# Patient Record
Sex: MALE | Race: WHITE | NOT HISPANIC OR LATINO | Employment: PART TIME | URBAN - NONMETROPOLITAN AREA
[De-identification: names, ages, dates, MRNs, and addresses within clinical notes are randomized per-mention and may not be internally consistent; named-entity substitution may affect disease eponyms.]

---

## 2017-06-14 ENCOUNTER — HOSPITAL ENCOUNTER (EMERGENCY)
Facility: HOSPITAL | Age: 52
Discharge: HOME | End: 2017-06-14
Attending: FAMILY MEDICINE | Admitting: FAMILY MEDICINE
Payer: MEDICARE

## 2017-06-14 VITALS
DIASTOLIC BLOOD PRESSURE: 90 MMHG | OXYGEN SATURATION: 97 % | HEART RATE: 81 BPM | RESPIRATION RATE: 20 BRPM | SYSTOLIC BLOOD PRESSURE: 141 MMHG | TEMPERATURE: 97.4 F

## 2017-06-14 DIAGNOSIS — K29.70 GASTRITIS WITHOUT BLEEDING, UNSPECIFIED CHRONICITY, UNSPECIFIED GASTRITIS TYPE: ICD-10-CM

## 2017-06-14 PROCEDURE — 99283 EMERGENCY DEPT VISIT LOW MDM: CPT

## 2017-06-14 PROCEDURE — 99284 EMERGENCY DEPT VISIT MOD MDM: CPT | Performed by: FAMILY MEDICINE

## 2017-06-14 NOTE — ED AVS SNAPSHOT
[facility]    [address] [address]    Phone:  [phone]                                       Bradley Garcia   MRN: [id number]    Department:  [facility]   Date of Visit:  6/14/2017           Patient Information     Date Of Birth          1965        Your diagnoses for this visit were:     Gastritis without bleeding, unspecified chronicity, unspecified gastritis type        You were seen by Crystal Bess MD.      Follow-up Information     Follow up with Cayla Fleming NP.    Specialty:  Nurse Practitioner    Why:  As needed    Contact information:    [facility]  [address] [facility] [address] [phone]          Discharge Instructions         Understanding Gastritis    Gastritis is a painful inflammation of the stomach lining. It has a number of causes. Gastritis and its symptoms can be relieved with treatment. Work with your healthcare provider to find ways to treat your symptoms.  The Stomach  To digest the food you eat, your stomach makes strong acids and enzymes. A healthy stomach has built-in defenses that protect its lining from damage by these acids and enzymes.  When you have gastritis  Acids may damage the stomach lining when the built-in defenses of the stomach don t function as they should. The stomach lining can then become inflamed. When this happens, it is called gastritis.  Causes of gastritis  Gastritis has many causes. They may include:    Aspirin and anti-inflammatory medicines    Tobacco use    Alcohol use    Helicobacter pylori (H. pylori) bacteria    Trauma from injuries, burns, or major surgery    Critical illness or autoimmune disorders  Common symptoms  With gastritis, you may notice one or more of the following:    A burning feeling in your upper belly    Pain that happens after eating certain foods    Gas or a bloated feeling in your stomach    Frequent belching    Nausea with or without  vomiting    Loss of appetite    Feeling full quickly    Fatigue  Date Last Reviewed: 7/1/2016 2000-2017 [employer]. [address] [place] [address], [place] [address]. All rights reserved. This information is not intended as a substitute for professional medical care. Always follow your healthcare professional's instructions.             Review of your medicines      START taking        Dose / Directions Last dose taken    omeprazole 20 MG CR capsule   Commonly known as:  priLOSEC   Dose:  20 mg   Quantity:  4 capsule        Take 1 capsule (20 mg) by mouth daily for 4 days   Refills:  0          Our records show that you are taking the medicines listed below. If these are incorrect, please call your family doctor or clinic.        Dose / Directions Last dose taken    ASPIRIN PO   Dose:  81 mg        Take 81 mg by mouth daily   Refills:  0        bismuth subsalicylate 262 MG/15ML suspension   Commonly known as:  PEPTO BISMOL   Dose:  15 mL        Take 15 mLs by mouth every 6 hours as needed for indigestion   Refills:  0        blood glucose lancets standard   Commonly known as:  no brand specified   Dose:  1 lancet   Quantity:  1 kit        1 lancet daily.   Refills:  prn        BLOOD GLUCOSE TEST STRIPS STRP   Dose:  1 strip   Quantity:  50 strip        1 strip daily.   Refills:  12        IBUPROFEN   Dose:  600 mg        600 mg as needed.   Refills:  0        Levothyroxine Sodium 50 MCG Caps   Dose:  50 mcg        Take 50 mcg by mouth daily.   Refills:  0        METFORMIN HCL PO        Take by mouth 2 times daily (with meals)   Refills:  0                Prescriptions were sent or printed at these locations (1 Prescription)                   Other Prescriptions                Not Printed or Sent (1 of 1)         omeprazole (PRILOSEC) 20 MG CR capsule                Orders Needing Specimen Collection     None      Pending Results     No orders found from 6/12/2017 to 6/15/2017.            Pending Culture  "Results     No orders found from 2017 to 6/15/2017.            Thank you for choosing [facility]       Thank you for choosing [facility] for your care. Our goal is always to provide you with excellent care. Hearing back from our patients is one way we can continue to improve our services. Please take a few minutes to complete the written survey that you may receive in the mail after you visit with us. Thank you!        [employer] Information     [employer] lets you send messages to your doctor, view your test results, renew your prescriptions, schedule appointments and more. To sign up, go to [email or web address] . Click on \"Log in\" on the left side of the screen, which will take you to the Welcome page. Then click on \"Sign up Now\" on the right side of the page.     You will be asked to enter the access code listed below, as well as some personal information. Please follow the directions to create your username and password.     Your access code is: [email or web address]  Expires: 2017 10:55 PM     Your access code will  in 90 days. If you need help or a new code, please call your [facility] clinic or [phone].        Care EveryWhere ID     This is your Care EveryWhere ID. This could be used by other organizations to access your [facility] medical records  [id number]        After Visit Summary       This is your record. Keep this with you and show to your community pharmacist(s) and doctor(s) at your next visit.                  "

## 2017-06-14 NOTE — ED AVS SNAPSHOT
[facility]    [address] [place] [address]    Phone:  [phone]                                       Bradley Garcia   MRN: [id number]    Department[facility]  [facility]   Date of Visit:  6/14/2017           After Visit Summary Signature Page     I have received my discharge instructions, and my questions have been answered. I have discussed any challenges I see with this plan with the nurse or doctor.    ..........................................................................................................................................  Patient/Patient Representative Signature      ..........................................................................................................................................  Patient Representative Print Name and Relationship to Patient    ..................................................               ................................................  Date                                            Time    ..........................................................................................................................................  Reviewed by Signature/Title    ...................................................              ..............................................  Date                                                            Time

## 2017-06-15 ASSESSMENT — ENCOUNTER SYMPTOMS
VOMITING: 0
NEUROLOGICAL NEGATIVE: 1
CONSTIPATION: 0
ACTIVITY CHANGE: 1
NAUSEA: 0
DIAPHORESIS: 0
DIARRHEA: 0
ROS GI COMMENTS: SEE HPI.
ABDOMINAL PAIN: 1
FEVER: 0
SHORTNESS OF BREATH: 0
FATIGUE: 0

## 2017-06-15 NOTE — DISCHARGE INSTRUCTIONS
Understanding Gastritis    Gastritis is a painful inflammation of the stomach lining. It has a number of causes. Gastritis and its symptoms can be relieved with treatment. Work with your healthcare provider to find ways to treat your symptoms.  The Stomach  To digest the food you eat, your stomach makes strong acids and enzymes. A healthy stomach has built-in defenses that protect its lining from damage by these acids and enzymes.  When you have gastritis  Acids may damage the stomach lining when the built-in defenses of the stomach don t function as they should. The stomach lining can then become inflamed. When this happens, it is called gastritis.  Causes of gastritis  Gastritis has many causes. They may include:    Aspirin and anti-inflammatory medicines    Tobacco use    Alcohol use    Helicobacter pylori (H. pylori) bacteria    Trauma from injuries, burns, or major surgery    Critical illness or autoimmune disorders  Common symptoms  With gastritis, you may notice one or more of the following:    A burning feeling in your upper belly    Pain that happens after eating certain foods    Gas or a bloated feeling in your stomach    Frequent belching    Nausea with or without vomiting    Loss of appetite    Feeling full quickly    Fatigue  Date Last Reviewed: 7/1/2016 2000-2017 [employer] [address] [place] [address]. All rights reserved. This information is not intended as a substitute for professional medical care. Always follow your healthcare professional's instructions.

## 2017-06-15 NOTE — ED NOTES
"Pt states he had some sausage in the counter thawing, states he ate some for supper, approx 2.5 hours later he started vomiting undigested food. Pt points to epigastric area/mid upper abdomen when talking about the pain. Stated he took some Pepto bismol \"and now I feel a little bit better\". Pt brought in by his brother. Brother states the sausage didn't have a bad odor and looked ok to him. Pt reports no loose stools.  "

## 2017-06-15 NOTE — ED PROVIDER NOTES
"  History     Chief Complaint   Patient presents with     Abdominal Pain     ate some \"brats\" and has vomited 5x     HPI  Bradley Garcia is a 51 year old male who ate some brats and had abdominal pain and vomiting x5.  He has taken some Pepto Bismol, and the medication took effect when he got to the ED, so now he has no pain and the nausea is gone.      I have reviewed the Medications, Allergies, Past Medical and Surgical History, and Social History in the Epic system.    Allergies: No Known Allergies      No current facility-administered medications on file prior to encounter.   Current Outpatient Prescriptions on File Prior to Encounter:  Levothyroxine Sodium 50 MCG CAPS Take 50 mcg by mouth daily.   Blood Glucose Monitoring Suppl (BLOOD GLUCOSE TEST STRIPS STRP) 1 strip daily.   lancets misc. KIT 1 lancet daily.   IBUPROFEN 600 mg as needed.       Patient Active Problem List   Diagnosis     DM type 2 (diabetes mellitus, type 2) (H)       No past surgical history on file.    Social History   Substance Use Topics     Smoking status: Current Some Day Smoker     Smokeless tobacco: Not on file     Alcohol use Yes      Comment: binge daily 23 beers today         There is no immunization history on file for this patient.    BMI: Estimated body mass index is 47.31 kg/(m^2) as calculated from the following:    Height as of 5/30/13: 1.803 m (5' 11\").    Weight as of 5/30/13: 153.9 kg (339 lb 3.2 oz).      Review of Systems   Constitutional: Positive for activity change. Negative for diaphoresis, fatigue and fever.   HENT: Negative.    Respiratory: Negative for shortness of breath.    Cardiovascular: Negative for chest pain.   Gastrointestinal: Positive for abdominal pain. Negative for constipation, diarrhea, nausea and vomiting.        See HPI.   Genitourinary: Negative.    Neurological: Negative.        Physical Exam   BP: 151/92  Pulse: 81  Temp: 98.2  F (36.8  C)  Resp: 18  SpO2: 96 %  Physical Exam   Constitutional: " He appears well-developed and well-nourished.   Cardiovascular: Normal rate, regular rhythm and normal heart sounds.    No murmur heard.  Pulmonary/Chest: Effort normal and breath sounds normal. No respiratory distress.   Abdominal: Soft. Bowel sounds are normal. He exhibits no distension. There is no tenderness.   Skin: Skin is warm and dry.   Psychiatric: He has a normal mood and affect.   Nursing note and vitals reviewed.      ED Course     ED Course     Procedures      Labs Ordered and Resulted from Time of ED Arrival Up to the Time of Departure from the ED - No data to display    Assessments & Plan (with Medical Decision Making)   Patient doing well, exam entirely negative.  Will discharge on 4 days of omeprazole 20mg to calm stomach down, otherwise no change.  Follow up with primary care as needed.    I have reviewed the nursing notes.    I have reviewed the findings, diagnosis, plan and need for follow up with the patient.    New Prescriptions    OMEPRAZOLE (PRILOSEC) 20 MG CR CAPSULE    Take 1 capsule (20 mg) by mouth daily for 4 days       Final diagnoses:   Gastritis without bleeding, unspecified chronicity, unspecified gastritis type       6/14/2017   [facility]     Crystal Bess MD  06/15/17 0176

## 2017-11-02 ENCOUNTER — APPOINTMENT (OUTPATIENT)
Dept: OCCUPATIONAL MEDICINE | Facility: OTHER | Age: 52
End: 2017-11-02

## 2017-11-02 PROCEDURE — 99000 SPECIMEN HANDLING OFFICE-LAB: CPT

## 2017-12-02 ENCOUNTER — APPOINTMENT (OUTPATIENT)
Dept: GENERAL RADIOLOGY | Facility: HOSPITAL | Age: 52
End: 2017-12-02
Attending: INTERNAL MEDICINE
Payer: MEDICARE

## 2017-12-02 ENCOUNTER — HOSPITAL ENCOUNTER (EMERGENCY)
Facility: HOSPITAL | Age: 52
Discharge: HOME | End: 2017-12-03
Attending: INTERNAL MEDICINE | Admitting: INTERNAL MEDICINE
Payer: MEDICARE

## 2017-12-02 ENCOUNTER — APPOINTMENT (OUTPATIENT)
Dept: CT IMAGING | Facility: HOSPITAL | Age: 52
End: 2017-12-02
Attending: INTERNAL MEDICINE
Payer: MEDICARE
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
## 2017-12-02 DIAGNOSIS — K86.0 ALCOHOL-INDUCED CHRONIC PANCREATITIS (H): ICD-10-CM

## 2017-12-02 DIAGNOSIS — F10.10 ALCOHOL ABUSE: ICD-10-CM

## 2017-12-02 LAB
ALBUMIN UR-MCNC: NEGATIVE MG/DL
AMPHETAMINES UR QL SCN: NEGATIVE
APPEARANCE UR: CLEAR
BACTERIA #/AREA URNS HPF: ABNORMAL /HPF
BARBITURATES UR QL: NEGATIVE
BASOPHILS # BLD AUTO: 0.1 10E9/L (ref 0–0.2)
BASOPHILS NFR BLD AUTO: 0.6 %
BENZODIAZ UR QL: NEGATIVE
BILIRUB UR QL STRIP: NEGATIVE
CANNABINOIDS UR QL SCN: NEGATIVE
COCAINE UR QL: NEGATIVE
COLOR UR AUTO: ABNORMAL
DIFFERENTIAL METHOD BLD: ABNORMAL
EOSINOPHIL # BLD AUTO: 0.3 10E9/L (ref 0–0.7)
EOSINOPHIL NFR BLD AUTO: 2 %
ERYTHROCYTE [DISTWIDTH] IN BLOOD BY AUTOMATED COUNT: 13.3 % (ref 10–15)
GLUCOSE UR STRIP-MCNC: NEGATIVE MG/DL
HCT VFR BLD AUTO: 41.5 % (ref 40–53)
HGB BLD-MCNC: 14.3 G/DL (ref 13.3–17.7)
HGB UR QL STRIP: NEGATIVE
IMM GRANULOCYTES # BLD: 0.1 10E9/L (ref 0–0.4)
IMM GRANULOCYTES NFR BLD: 0.4 %
KETONES UR STRIP-MCNC: NEGATIVE MG/DL
LEUKOCYTE ESTERASE UR QL STRIP: NEGATIVE
LYMPHOCYTES # BLD AUTO: 3.2 10E9/L (ref 0.8–5.3)
LYMPHOCYTES NFR BLD AUTO: 25.8 %
MCH RBC QN AUTO: 30.6 PG (ref 26.5–33)
MCHC RBC AUTO-ENTMCNC: 34.5 G/DL (ref 31.5–36.5)
MCV RBC AUTO: 89 FL (ref 78–100)
METHADONE UR QL SCN: NEGATIVE
MONOCYTES # BLD AUTO: 0.9 10E9/L (ref 0–1.3)
MONOCYTES NFR BLD AUTO: 7 %
NEUTROPHILS # BLD AUTO: 8.1 10E9/L (ref 1.6–8.3)
NEUTROPHILS NFR BLD AUTO: 64.2 %
NITRATE UR QL: NEGATIVE
NRBC # BLD AUTO: 0 10*3/UL
NRBC BLD AUTO-RTO: 0 /100
OPIATES UR QL SCN: NEGATIVE
PCP UR QL SCN: NEGATIVE
PH UR STRIP: 5 PH (ref 4.7–8)
PLATELET # BLD AUTO: 241 10E9/L (ref 150–450)
RBC # BLD AUTO: 4.67 10E12/L (ref 4.4–5.9)
RBC #/AREA URNS AUTO: 0 /HPF (ref 0–2)
SOURCE: ABNORMAL
SP GR UR STRIP: 1 (ref 1–1.03)
UROBILINOGEN UR STRIP-MCNC: NORMAL MG/DL (ref 0–2)
WBC # BLD AUTO: 12.6 10E9/L (ref 4–11)
WBC #/AREA URNS AUTO: 0 /HPF (ref 0–2)

## 2017-12-02 PROCEDURE — 80307 DRUG TEST PRSMV CHEM ANLYZR: CPT | Performed by: INTERNAL MEDICINE

## 2017-12-02 PROCEDURE — 99285 EMERGENCY DEPT VISIT HI MDM: CPT | Performed by: INTERNAL MEDICINE

## 2017-12-02 PROCEDURE — 70450 CT HEAD/BRAIN W/O DYE: CPT | Mod: TC

## 2017-12-02 PROCEDURE — 25000128 H RX IP 250 OP 636: Performed by: INTERNAL MEDICINE

## 2017-12-02 PROCEDURE — 83690 ASSAY OF LIPASE: CPT | Performed by: INTERNAL MEDICINE

## 2017-12-02 PROCEDURE — 85025 COMPLETE CBC W/AUTO DIFF WBC: CPT | Performed by: INTERNAL MEDICINE

## 2017-12-02 PROCEDURE — 81001 URINALYSIS AUTO W/SCOPE: CPT | Mod: 59 | Performed by: INTERNAL MEDICINE

## 2017-12-02 PROCEDURE — 99284 EMERGENCY DEPT VISIT MOD MDM: CPT | Mod: 25

## 2017-12-02 PROCEDURE — 80320 DRUG SCREEN QUANTALCOHOLS: CPT | Performed by: INTERNAL MEDICINE

## 2017-12-02 PROCEDURE — 36415 COLL VENOUS BLD VENIPUNCTURE: CPT | Performed by: INTERNAL MEDICINE

## 2017-12-02 PROCEDURE — 82550 ASSAY OF CK (CPK): CPT | Performed by: INTERNAL MEDICINE

## 2017-12-02 PROCEDURE — 73562 X-RAY EXAM OF KNEE 3: CPT | Mod: TC,RT

## 2017-12-02 PROCEDURE — 80053 COMPREHEN METABOLIC PANEL: CPT | Performed by: INTERNAL MEDICINE

## 2017-12-02 RX ADMIN — SODIUM CHLORIDE 1000 ML: 9 INJECTION, SOLUTION INTRAVENOUS at 23:50
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
## 2017-12-02 NOTE — ED AVS SNAPSHOT
[facility]    [address] [place] [address]    Phone:  [phone]                                       Bradley Garcia   MRN: [id number]    Department[facility]  [facility]   Date of Visit:  12/2/2017           After Visit Summary Signature Page     I have received my discharge instructions, and my questions have been answered. I have discussed any challenges I see with this plan with the nurse or doctor.    ..........................................................................................................................................  Patient/Patient Representative Signature      ..........................................................................................................................................  Patient Representative Print Name and Relationship to Patient    ..................................................               ................................................  Date                                            Time    ..........................................................................................................................................  Reviewed by Signature/Title    ...................................................              ..............................................  Date                                                            Time

## 2017-12-03 VITALS
TEMPERATURE: 98 F | SYSTOLIC BLOOD PRESSURE: 118 MMHG | RESPIRATION RATE: 20 BRPM | OXYGEN SATURATION: 98 % | DIASTOLIC BLOOD PRESSURE: 66 MMHG | HEART RATE: 90 BPM

## 2017-12-03 LAB
ALBUMIN SERPL-MCNC: 3.7 G/DL (ref 3.4–5)
ALP SERPL-CCNC: 88 U/L (ref 40–150)
ALT SERPL W P-5'-P-CCNC: 27 U/L (ref 0–70)
ANION GAP SERPL CALCULATED.3IONS-SCNC: 10 MMOL/L (ref 3–14)
AST SERPL W P-5'-P-CCNC: 25 U/L (ref 0–45)
BILIRUB SERPL-MCNC: 0.5 MG/DL (ref 0.2–1.3)
BUN SERPL-MCNC: 18 MG/DL (ref 7–30)
CALCIUM SERPL-MCNC: 8.8 MG/DL (ref 8.5–10.1)
CHLORIDE SERPL-SCNC: 104 MMOL/L (ref 94–109)
CK SERPL-CCNC: 168 U/L (ref 30–300)
CO2 SERPL-SCNC: 22 MMOL/L (ref 20–32)
CREAT SERPL-MCNC: 0.8 MG/DL (ref 0.66–1.25)
ETHANOL SERPL-MCNC: 0.22 G/DL
GFR SERPL CREATININE-BSD FRML MDRD: >90 ML/MIN/1.7M2
GLUCOSE SERPL-MCNC: 110 MG/DL (ref 70–99)
LIPASE SERPL-CCNC: 967 U/L (ref 73–393)
POTASSIUM SERPL-SCNC: 4 MMOL/L (ref 3.4–5.3)
PROT SERPL-MCNC: 8.6 G/DL (ref 6.8–8.8)
SODIUM SERPL-SCNC: 136 MMOL/L (ref 133–144)

## 2017-12-03 ASSESSMENT — ENCOUNTER SYMPTOMS
CONFUSION: 0
VOMITING: 0
FEVER: 0
SLEEP DISTURBANCE: 0
BLOOD IN STOOL: 0
PALPITATIONS: 0
COLOR CHANGE: 0
DIFFICULTY BREATHING: 0
HEADACHES: 0
MYALGIAS: 0
FREQUENCY: 0
NAUSEA: 0
DYSURIA: 0
ANAL BLEEDING: 0
BACK PAIN: 0
SEIZURES: 0
CHEST TIGHTNESS: 0
CHILLS: 0
DIZZINESS: 0
WHEEZING: 0
LOSS OF CONSCIOUSNESS: 0
NECK PAIN: 0
ABDOMINAL PAIN: 0
ABDOMINAL DISTENTION: 0
FLANK PAIN: 0
VOICE CHANGE: 0
DIAPHORESIS: 0
COUGH: 0
NUMBNESS: 0
SHORTNESS OF BREATH: 0
LIGHT-HEADEDNESS: 0
WEAKNESS: 0

## 2017-12-03 NOTE — ED NOTES
"Pt ok to d/c if he can tolerate food for 15 minutes without vomiting. Pt asked if he is willing to go to detox and he states \"yes\". Pt's brother willing to drive pt to detox in [place].  "
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
## 2017-12-03 NOTE — ED NOTES
Called detox and spoke with Demetria, nurse, who states that they have a bed. Pt again verbalizes agreement to go to detox. Pt and brother agree that it is ok for this RN to fax face sheet and BA and RACHUA labs to detox. Pt ate crackers and applesauce, denies any abdominal pain, nausea or vomiting. Per detox request, pt and brother will stop at home and  pt's home medications.
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
## 2017-12-03 NOTE — ED NOTES
Per Dr. Santo, do not need to upgrade trauma evaluation to a Level 2. Also per Dr. Santo, pt does not need a c-collar.
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
## 2017-12-07 ENCOUNTER — HOSPITAL ENCOUNTER (OUTPATIENT)
Dept: MRI IMAGING | Facility: HOSPITAL | Age: 52
Discharge: HOME | End: 2017-12-07
Attending: NURSE PRACTITIONER | Admitting: NURSE PRACTITIONER
Payer: MEDICARE

## 2017-12-07 DIAGNOSIS — M25.561 RIGHT KNEE PAIN: ICD-10-CM

## 2017-12-07 DIAGNOSIS — M23.51 CHRONIC INSTABILITY OF RIGHT KNEE: ICD-10-CM

## 2017-12-07 PROCEDURE — 73721 MRI JNT OF LWR EXTRE W/O DYE: CPT | Mod: TC,RT

## 2018-07-13 ENCOUNTER — APPOINTMENT (OUTPATIENT)
Dept: OCCUPATIONAL MEDICINE | Facility: OTHER | Age: 53
End: 2018-07-13

## 2018-07-13 PROCEDURE — 99000 SPECIMEN HANDLING OFFICE-LAB: CPT

## 2019-03-02 ENCOUNTER — APPOINTMENT (OUTPATIENT)
Dept: CT IMAGING | Facility: HOSPITAL | Age: 54
End: 2019-03-02
Attending: EMERGENCY MEDICINE
Payer: MEDICARE

## 2019-03-02 ENCOUNTER — TELEPHONE (OUTPATIENT)
Dept: EMERGENCY MEDICINE | Facility: HOSPITAL | Age: 54
End: 2019-03-02

## 2019-03-02 ENCOUNTER — HOSPITAL ENCOUNTER (EMERGENCY)
Facility: HOSPITAL | Age: 54
Discharge: HOME | End: 2019-03-02
Attending: EMERGENCY MEDICINE | Admitting: EMERGENCY MEDICINE
Payer: MEDICARE

## 2019-03-02 VITALS
OXYGEN SATURATION: 97 % | TEMPERATURE: 98.6 F | SYSTOLIC BLOOD PRESSURE: 134 MMHG | DIASTOLIC BLOOD PRESSURE: 75 MMHG | RESPIRATION RATE: 20 BRPM

## 2019-03-02 DIAGNOSIS — F10.929 ALCOHOLIC INTOXICATION WITH COMPLICATION (H): ICD-10-CM
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
## 2019-03-02 DIAGNOSIS — R73.9 HYPERGLYCEMIA: ICD-10-CM

## 2019-03-02 DIAGNOSIS — I10 HYPERTENSION, UNSPECIFIED TYPE: ICD-10-CM

## 2019-03-02 LAB
ANION GAP SERPL CALCULATED.3IONS-SCNC: 13 MMOL/L (ref 3–14)
BASOPHILS # BLD AUTO: 0.1 10E9/L (ref 0–0.2)
BASOPHILS NFR BLD AUTO: 0.9 %
BUN SERPL-MCNC: 12 MG/DL (ref 7–30)
CALCIUM SERPL-MCNC: 8.5 MG/DL (ref 8.5–10.1)
CHLORIDE SERPL-SCNC: 104 MMOL/L (ref 94–109)
CO2 SERPL-SCNC: 19 MMOL/L (ref 20–32)
CREAT SERPL-MCNC: 0.82 MG/DL (ref 0.66–1.25)
DIFFERENTIAL METHOD BLD: NORMAL
EOSINOPHIL # BLD AUTO: 0.3 10E9/L (ref 0–0.7)
EOSINOPHIL NFR BLD AUTO: 2.8 %
ERYTHROCYTE [DISTWIDTH] IN BLOOD BY AUTOMATED COUNT: 12.8 % (ref 10–15)
ETHANOL SERPL-MCNC: 0.28 G/DL
GFR SERPL CREATININE-BSD FRML MDRD: >90 ML/MIN/{1.73_M2}
GLUCOSE SERPL-MCNC: 232 MG/DL (ref 70–99)
HCT VFR BLD AUTO: 42.5 % (ref 40–53)
HGB BLD-MCNC: 14.9 G/DL (ref 13.3–17.7)
IMM GRANULOCYTES # BLD: 0.2 10E9/L (ref 0–0.4)
IMM GRANULOCYTES NFR BLD: 1.5 %
LYMPHOCYTES # BLD AUTO: 3.3 10E9/L (ref 0.8–5.3)
LYMPHOCYTES NFR BLD AUTO: 31.7 %
MCH RBC QN AUTO: 30.9 PG (ref 26.5–33)
MCHC RBC AUTO-ENTMCNC: 35.1 G/DL (ref 31.5–36.5)
MCV RBC AUTO: 88 FL (ref 78–100)
MONOCYTES # BLD AUTO: 0.8 10E9/L (ref 0–1.3)
MONOCYTES NFR BLD AUTO: 8 %
NEUTROPHILS # BLD AUTO: 5.7 10E9/L (ref 1.6–8.3)
NEUTROPHILS NFR BLD AUTO: 55.1 %
NRBC # BLD AUTO: 0 10*3/UL
NRBC BLD AUTO-RTO: 0 /100
PLATELET # BLD AUTO: 255 10E9/L (ref 150–450)
POTASSIUM SERPL-SCNC: 3.9 MMOL/L (ref 3.4–5.3)
RBC # BLD AUTO: 4.82 10E12/L (ref 4.4–5.9)
SODIUM SERPL-SCNC: 136 MMOL/L (ref 133–144)
TSH SERPL DL<=0.005 MIU/L-ACNC: 2.2 MU/L (ref 0.4–4)
WBC # BLD AUTO: 10.4 10E9/L (ref 4–11)

## 2019-03-02 PROCEDURE — 80320 DRUG SCREEN QUANTALCOHOLS: CPT | Performed by: EMERGENCY MEDICINE

## 2019-03-02 PROCEDURE — 84443 ASSAY THYROID STIM HORMONE: CPT | Performed by: EMERGENCY MEDICINE

## 2019-03-02 PROCEDURE — 85025 COMPLETE CBC W/AUTO DIFF WBC: CPT | Performed by: EMERGENCY MEDICINE

## 2019-03-02 PROCEDURE — 99284 EMERGENCY DEPT VISIT MOD MDM: CPT | Mod: 25

## 2019-03-02 PROCEDURE — 80048 BASIC METABOLIC PNL TOTAL CA: CPT | Performed by: EMERGENCY MEDICINE

## 2019-03-02 PROCEDURE — 99284 EMERGENCY DEPT VISIT MOD MDM: CPT | Mod: Z6 | Performed by: EMERGENCY MEDICINE

## 2019-03-02 PROCEDURE — 70450 CT HEAD/BRAIN W/O DYE: CPT | Mod: TC

## 2019-03-02 PROCEDURE — 36415 COLL VENOUS BLD VENIPUNCTURE: CPT | Performed by: EMERGENCY MEDICINE

## 2019-03-02 PROCEDURE — 72125 CT NECK SPINE W/O DYE: CPT | Mod: TC

## 2019-03-02 ASSESSMENT — ENCOUNTER SYMPTOMS
NECK PAIN: 0
JOINT SWELLING: 0
BACK PAIN: 0

## 2019-03-02 NOTE — ED AVS SNAPSHOT
[facility]  [address] [place] [id number]  Phone:  [phone]                                    Bradley Garcia   MRN: [id number]    Department:  [facility]   Date of Visit:  3/2/2019           After Visit Summary Signature Page    I have received my discharge instructions, and my questions have been answered. I have discussed any challenges I see with this plan with the nurse or doctor.    ..........................................................................................................................................  Patient/Patient Representative Signature      ..........................................................................................................................................  Patient Representative Print Name and Relationship to Patient    ..................................................               ................................................  Date                                   Time    ..........................................................................................................................................  Reviewed by Signature/Title    ...................................................              ..............................................  Date                                               Time          22EPIC Rev 08/18
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
## 2019-03-02 NOTE — ED NOTES
"Patient requesting to have his \"twin brother Gustavo\" to be called.  [phone]; patient's brother Wilfrido answered and he states his brother Gustavo will be in shortly to pick him up.  "
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
## 2019-03-02 NOTE — ED NOTES
IV was removed.  Discharge VS taken; patient is stable; steady on feet and eating/drinking.  Patient's brother is going to go to his home to  dry clothes and his belongings.  Will be right back.

## 2019-03-02 NOTE — ED NOTES
Patient remains stable.  Dressed in clean/dry clothes.  Patient is discharged with his brother to go to [facility].

## 2019-03-03 NOTE — PLAN OF CARE
Bree De La Vega called for continued care re: info on pt's. Medication & ED visit today. Awaiting SOPHIE, signed by pt. In order to fax info to Detox.

## 2019-03-04 NOTE — TELEPHONE ENCOUNTER
"----- Message from Quin Palacios RN sent at 3/2/2019  4:54 PM CST -----  Regarding: follow up  Bradley Magana was brought to our emergency room via ambulance.  He was found outside in his shirt and underwear \"rolling around in the snow\" patient was intoxicated and could not tell [place] [facility]/EMS why he was rolling around in the snow and intoxicated.  He was brought here for eval.  Once patient sobered up he was able to give us his \"twin brothers name (ean) and number ([phone]).  Ean came to pick his brother up and then requested that the patient goes to detox.  States the family has been trying to keep the patient sober and for the last week he has been doing good.  They are concerned because the patient has a \"friend\"/\"Maira\" that is no good for him and \"uses him\" sometimes starting to call him at 0400 in the morning and calls him every 2 minutes until he picks up.  They have tried talking to her but she continues.  Just wondering if there is any we can/should be doing?    Thanks Quin.  "

## 2020-07-13 ENCOUNTER — TRANSFERRED RECORDS (OUTPATIENT)
Dept: HEALTH INFORMATION MANAGEMENT | Facility: CLINIC | Age: 55
End: 2020-07-13

## 2021-05-11 DIAGNOSIS — I10 ESSENTIAL HYPERTENSION: ICD-10-CM

## 2021-05-11 DIAGNOSIS — E11.9 DM TYPE 2 (DIABETES MELLITUS, TYPE 2) (H): Primary | ICD-10-CM

## 2021-05-11 RX ORDER — GLYBURIDE 5 MG/1
5 TABLET ORAL DAILY
COMMUNITY
Start: 2014-02-11

## 2021-05-11 RX ORDER — NAPROXEN 500 MG/1
1 TABLET ORAL DAILY
COMMUNITY
Start: 2021-01-28

## 2021-05-11 RX ORDER — BACLOFEN 10 MG/1
10 TABLET ORAL 3 TIMES DAILY
COMMUNITY

## 2021-05-11 RX ORDER — NALTREXONE HYDROCHLORIDE 50 MG/1
50 TABLET, FILM COATED ORAL DAILY
COMMUNITY

## 2022-04-17 ENCOUNTER — HOSPITAL ENCOUNTER (EMERGENCY)
Facility: HOSPITAL | Age: 57
Discharge: HOME | End: 2022-04-17
Attending: EMERGENCY MEDICINE | Admitting: EMERGENCY MEDICINE
Payer: MEDICARE

## 2022-04-17 ENCOUNTER — APPOINTMENT (OUTPATIENT)
Dept: CT IMAGING | Facility: HOSPITAL | Age: 57
End: 2022-04-17
Attending: EMERGENCY MEDICINE
Payer: MEDICARE

## 2022-04-17 VITALS
RESPIRATION RATE: 18 BRPM | HEART RATE: 93 BPM | WEIGHT: 315 LBS | BODY MASS INDEX: 47.38 KG/M2 | SYSTOLIC BLOOD PRESSURE: 130 MMHG | TEMPERATURE: 97.9 F | DIASTOLIC BLOOD PRESSURE: 77 MMHG | OXYGEN SATURATION: 97 %

## 2022-04-17 DIAGNOSIS — S01.21XA LACERATION OF NOSE, INITIAL ENCOUNTER: ICD-10-CM

## 2022-04-17 DIAGNOSIS — S00.81XA ABRASION OF FACE, INITIAL ENCOUNTER: ICD-10-CM

## 2022-04-17 DIAGNOSIS — F10.920 ALCOHOLIC INTOXICATION WITHOUT COMPLICATION (H): ICD-10-CM

## 2022-04-17 LAB — GLUCOSE BLDC GLUCOMTR-MCNC: 172 MG/DL (ref 70–99)

## 2022-04-17 PROCEDURE — 99283 EMERGENCY DEPT VISIT LOW MDM: CPT | Mod: 25 | Performed by: EMERGENCY MEDICINE

## 2022-04-17 PROCEDURE — 12011 RPR F/E/E/N/L/M 2.5 CM/<: CPT | Performed by: EMERGENCY MEDICINE

## 2022-04-17 PROCEDURE — 99284 EMERGENCY DEPT VISIT MOD MDM: CPT | Mod: 25

## 2022-04-17 PROCEDURE — 12011 RPR F/E/E/N/L/M 2.5 CM/<: CPT

## 2022-04-17 PROCEDURE — G1004 CDSM NDSC: HCPCS

## 2022-04-17 ASSESSMENT — ENCOUNTER SYMPTOMS
FEVER: 0
CHILLS: 0
SHORTNESS OF BREATH: 0

## 2022-04-17 NOTE — ED NOTES
Phone call to sister Maira, [phone], per patient's request. No answer and no voicemail option.

## 2022-04-17 NOTE — ED PROVIDER NOTES
History     Chief Complaint   Patient presents with     Alcohol Intoxication     Fall     HPI  Bradley Garcia is a 56 year old male who was bib ems s/p mechanical fall. Was drinking at a bar, went home, fell in the middle of an intersection. Attributes this to etoh intox. Denies HA, neck pain. States front of face where he has abrasions is painful. No other complaints. Takes no blood thinners.    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 [facility]- record.   IMO Update 10/11          Past Medical History:    No past medical history on file.    Past Surgical History:    Past Surgical History:   Procedure Laterality Date     MOUTH SURGERY      oral surgery       Family History:    Family History   Problem Relation Age of Onset     Alcoholism Father      Cancer Father      Diabetes Brother      Diabetes Cousin        Social History:  Marital Status:  Single [1]  Social History     Tobacco Use     Smoking status: Never Smoker     Smokeless tobacco: Never Used   Substance Use Topics     Alcohol use: Yes     Comment: beer and whisky      Drug use: No        Medications:    ASPIRIN PO  baclofen (LIORESAL) 10 MG tablet  Blood Glucose Monitoring Suppl (BLOOD GLUCOSE TEST STRIPS STRP)  glyBURIDE (DIABETA /MICRONASE) 5 MG tablet  lancets misc. KIT  Levothyroxine Sodium 50 MCG CAPS  METFORMIN HCL PO  naltrexone (DEPADE/REVIA) 50 MG tablet  naproxen (NAPROSYN) 500 MG tablet  omeprazole (PRILOSEC) 20 MG   capsule          Review of Systems   Constitutional: Negative for chills and fever.   Respiratory: Negative for shortness of breath.    Cardiovascular: Negative for chest pain.   All other systems reviewed and are negative.      Physical Exam   BP: 132/82  Pulse: 98  Temp: 98.3  F (36.8  C)  Resp: 20  Weight: (!) 154.1 kg (339 lb 11.2 oz)  SpO2: 97 %      Physical Exam  Constitutional:       General: He is not in acute distress.     Appearance: Normal appearance.   HENT:      Head: Normocephalic.      Comments: Abrasions noted over face, small 1cm laceration to anterior nose     Right Ear: External ear normal.      Left Ear: External ear normal.      Nose: Nose normal. No rhinorrhea.   Eyes:      Conjunctiva/sclera: Conjunctivae normal.      Comments: Nystagmus c/w etoh intoxication   Cardiovascular:      Rate and Rhythm: Normal rate and regular rhythm.      Pulses: Normal pulses.   Pulmonary:      Effort: Pulmonary effort is normal. No respiratory distress.      Breath sounds: Normal breath sounds.   Abdominal:      General: There is no distension.      Palpations: Abdomen is soft.      Tenderness: There is no abdominal tenderness.   Musculoskeletal:         General: No deformity or signs of injury.   Skin:     General: Skin is warm and dry.      Capillary Refill: Capillary refill takes less than 2 seconds.   Neurological:      General: No focal deficit present.      Mental Status: He is alert and oriented to person, place, and time. Mental status is at baseline.      Cranial Nerves: No cranial nerve deficit.      Sensory: No sensory deficit.      Motor: No weakness.      Coordination: Coordination normal.      Deep Tendon Reflexes: Reflexes normal.      Comments: uncoordinated gait   Psychiatric:         Mood and Affect: Mood normal.         Behavior: Behavior normal.         ED Course                 [facility]    -Laceration Repair    Date/Time: 4/17/2022 3:52 AM  Performed by: Beau Roman  MD  Authorized by: Beau Roman MD     Risks, benefits and alternatives discussed.      ANESTHESIA (see MAR for exact dosages):     Anesthesia method:  None  LACERATION DETAILS     Location:  Face    Face location:  Nose    Length (cm):  1    REPAIR TYPE:     Repair type:  Simple      EXPLORATION:     Wound exploration: wound explored through full range of motion and entire depth of wound probed and visualized      Wound extent: areolar tissue violated      Contaminated: yes      TREATMENT:     Area cleansed with:  Soap and water    Amount of cleaning:  Standard    Irrigation solution:  Tap water    Irrigation volume:  100    Irrigation method:  Pressure wash    Visualized foreign bodies/material removed: yes      SKIN REPAIR     Repair method:  Tissue adhesive    APPROXIMATION     Approximation:  Close    POST-PROCEDURE DETAILS     Dressing:  Open (no dressing)        PROCEDURE    Patient Tolerance:  Patient tolerated the procedure well with no immediate complications               Critical Care time:               Results for orders placed or performed during the hospital encounter of 04/17/22 (from the past 24 hour(s))   Glucose by meter   Result Value Ref [facility]    GLUCOSE BY METER POCT 172 (H) 70 - 99 mg/dL       Medications - No data to display    Assessments & Plan (with Medical Decision Making)     I have reviewed the nursing notes.    I have reviewed the findings, diagnosis, plan and need for follow up with the patient.  55 yo m here w/ intoxication and fall. Ct head and c-spine w/o fx. Laceration to nose repaired. Too intoxicated to go home, will let him sober up here and go home in AM.    New Prescriptions    No medications on file       Final diagnoses:   Alcoholic intoxication without complication (H)   Laceration of nose, initial encounter   Abrasion of face, initial encounter       4/17/2022   [facility]     Beau Roman MD  04/17/22 0354

## 2022-04-17 NOTE — ED NOTES
Face cleaned of dried blood with sterile water and gauze. Tolerated well. Continues to deny any pain.

## 2022-04-17 NOTE — ED NOTES
Patient laying awake in bed. Denies pain. Requesting sister Emiliana be called for a ride home. Generic voicemail left asking Emiliana to call the ER back. Emiliana's cell phone number is not on file in patient's chart and is [phone].

## 2022-04-17 NOTE — ED NOTES
Sister Emiliana called back and states she is 30 minutes out of town and would not be able to pick patient up until 10 am. Emiliana states patient has 2 other siblings in town that might be able to pick him up.

## 2022-04-17 NOTE — ED NOTES
Arrived to ER room 4 via [facility] ambulance with c/o alcohol intoxication and facial injuries after falling in the middle of the street while walking home from the [place]. Patient does not recall falling. Alert and oriented x3 and answering questions appropriately. Reports he was at the bar drinking pitchers of beer and Yifan cokes and does not recall falling. Patient does not know how long he was laying in the street. Denies pain when asked. Has bright red blood and dried black blood around nose, on upper lip, and around nares. Small laceration with small amount of bleeding noted to tip of nose. Abrasion noted to left medial brow with very small amount of bleeding. Small amount of bleeding noted from bilateral nares. Denies being on anticoagulants and home medication list does not list any anticoagulants.

## 2022-04-17 NOTE — ED NOTES
Writer spoke with patient. Patient notified that sister, Emiliana, would not be able to pick him up. Patient asked writer to call his brother, Gustavo. Patient denies any pain or needs at this time.     Writer attempted to call Gustavo, and his brother, Wilfrido answered, reporting that Gustavo had ran to the store and does not have a cell. Wilfrido unable to come  Gustavo at this time, but will have Gustavo call facility when he returns home and will be able to pick patient up. Wilfrido gave an ETA of under 2 hours.

## 2022-04-17 NOTE — ED NOTES
Face cleaned of dried blood again. Tolerated well and continues to deny pain. Dr. Roman at bedside for wound repair with dermabond.

## 2022-04-17 NOTE — ED NOTES
Ambulated to bathroom with use of gait belt and assist of 2 staff. Unsteady on feet due to alcohol intoxication. Ambulated back to bed. All clothing removed. Full skin inspection. Abrasions with no active bleeding noted to bilateral knees. Areas of blanchable redness on left breast and left abdomen that is cool to the touch from where body was laying on cold ground. Denies any pain to areas. No noted injuries on back, buttocks, back of legs, or feet. Incontinent of stool. Cleaned with bath wipes. Clean, dry gown placed. Able to lay self back into bed and adjust self into center of bed. Warm blankets placed. Call light within reach.

## 2022-12-02 ENCOUNTER — HOSPITAL ENCOUNTER (EMERGENCY)
Facility: HOSPITAL | Age: 57
Discharge: HOME | End: 2022-12-02
Attending: NURSE PRACTITIONER | Admitting: NURSE PRACTITIONER
Payer: MEDICARE

## 2022-12-02 VITALS
RESPIRATION RATE: 18 BRPM | OXYGEN SATURATION: 95 % | HEART RATE: 92 BPM | TEMPERATURE: 97.6 F | SYSTOLIC BLOOD PRESSURE: 170 MMHG | DIASTOLIC BLOOD PRESSURE: 103 MMHG

## 2022-12-02 DIAGNOSIS — H61.21 IMPACTED CERUMEN OF RIGHT EAR: ICD-10-CM

## 2022-12-02 DIAGNOSIS — H66.91 RIGHT OTITIS MEDIA: Primary | ICD-10-CM

## 2022-12-02 DIAGNOSIS — H66.91 RIGHT OTITIS MEDIA, UNSPECIFIED OTITIS MEDIA TYPE: ICD-10-CM

## 2022-12-02 PROCEDURE — 999N000104 HC STATISTIC NO CHARGE

## 2022-12-02 PROCEDURE — G0463 HOSPITAL OUTPT CLINIC VISIT: HCPCS

## 2022-12-02 PROCEDURE — 69210 REMOVE IMPACTED EAR WAX UNI: CPT | Mod: RT | Performed by: NURSE PRACTITIONER

## 2022-12-02 PROCEDURE — 69210 REMOVE IMPACTED EAR WAX UNI: CPT

## 2022-12-02 RX ORDER — AMOXICILLIN 875 MG
875 TABLET ORAL 2 TIMES DAILY
Qty: 20 TABLET | Refills: 0 | Status: SHIPPED | OUTPATIENT
Start: 2022-12-02 | End: 2022-12-12

## 2022-12-02 ASSESSMENT — ENCOUNTER SYMPTOMS
PSYCHIATRIC NEGATIVE: 1
FEVER: 0
DIZZINESS: 0
RHINORRHEA: 0
VOMITING: 0
CHILLS: 0
NAUSEA: 0
EYE REDNESS: 0
COUGH: 0
MYALGIAS: 0
SORE THROAT: 0
HEADACHES: 0
DIARRHEA: 0
EYE DISCHARGE: 0
SHORTNESS OF BREATH: 0

## 2022-12-02 NOTE — ED TRIAGE NOTES
Patient presents to urgent care with c/o right sided ear pain. States it started two days ago and is just not getting better. States hes been using a warm pack and that helps some and it did make a pop sound today and no longer hurts as bad as it did before.

## 2022-12-02 NOTE — ED PROVIDER NOTES
History     Chief Complaint   Patient presents with     Otalgia     HPI  Bradley Garcia is a 57 year old male whopresents to urgent care today with complaints of right ear pain.  History of cerumen impaction.  Denies any tinnitus.  Denies any hearing loss.  Denies any dizziness.  Denies any recent URI.  Denies any swimming/flying recently.  Denies any fever, chills, nausea, vomiting, diarrhea, shortness of breath or chest pain.  No other concerns.    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 [facility]- record.   IMO Update 10/11          Past Medical History:    No past medical history on file.    Past Surgical History:    Past Surgical History:   Procedure Laterality Date     MOUTH SURGERY      oral surgery       Family History:    Family History   Problem Relation Age of Onset     Alcoholism Father      Cancer Father      Diabetes Brother      Diabetes Cousin        Social History:  Marital Status:  Single [1]  Social History     Tobacco Use     Smoking status: Never     Smokeless tobacco: Never   Substance Use Topics     Alcohol use: Yes     Comment: beer and whisky      Drug use: No        Medications:    amoxicillin (AMOXIL) 875 MG tablet  ASPIRIN PO  baclofen (LIORESAL) 10 MG tablet  Blood Glucose Monitoring Suppl (BLOOD GLUCOSE TEST STRIPS STRP)  glyBURIDE (DIABETA /MICRONASE) 5 MG tablet  lancets misc. KIT  Levothyroxine Sodium 50 MCG CAPS  METFORMIN HCL PO  naltrexone (DEPADE/REVIA) 50 MG  tablet  naproxen (NAPROSYN) 500 MG tablet  omeprazole (PRILOSEC) 20 MG DR capsule      Review of Systems   Constitutional: Negative for chills and fever.   HENT: Positive for ear pain (right ear). Negative for congestion, hearing loss, rhinorrhea, sore throat and tinnitus.    Eyes: Negative for discharge and redness.   Respiratory: Negative for cough and shortness of breath.    Cardiovascular: Negative for chest pain.   Gastrointestinal: Negative for diarrhea, nausea and vomiting.   Genitourinary: Negative for decreased urine volume.   Musculoskeletal: Negative for myalgias.   Skin: Negative for rash.   Neurological: Negative for dizziness and headaches.   Psychiatric/Behavioral: Negative.      Physical Exam   BP: (!) 179/105  Pulse: 92  Temp: 97.6  F (36.4  C)  Resp: 18  SpO2: 95 %  Recheck  BP: 170/103    Physical Exam  Vitals and nursing note reviewed.   Constitutional:       General: He is not in acute distress.     Appearance: Normal appearance. He is not ill-appearing or toxic-appearing.   HENT:      Head: Normocephalic.      Right Ear: There is impacted cerumen. Tympanic membrane is erythematous and bulging. Tympanic membrane is not perforated.      Left Ear: Tympanic membrane, ear canal and external ear normal.      Nose: Nose normal.      Mouth/Throat:      Mouth: Mucous membranes are moist.      Pharynx: Oropharynx is clear.   Cardiovascular:      Rate and Rhythm: Normal rate and regular rhythm.      Pulses: Normal pulses.      Heart sounds: Normal heart sounds.   Pulmonary:      Effort: Pulmonary effort is normal.      Breath sounds: Normal breath sounds.   Abdominal:      General: Bowel sounds are normal.      Palpations: Abdomen is soft.   Musculoskeletal:      Cervical back: Normal range of motion and neck supple.   Skin:     General: Skin is warm and dry.   Neurological:      Mental Status: He is alert.   Psychiatric:         Mood and Affect: Mood normal.       ED Course     No results found for this  or any previous visit (from the past 24 hour(s)).    Medications - No data to display    Assessments & Plan (with Medical Decision Making)     I have reviewed the nursing notes.    I have reviewed the findings, diagnosis, plan and need for follow up with the patient.  (H66.91) Right otitis media  (primary encounter diagnosis)  (H61.21) Impacted cerumen of right ear  Plan:   Patient ambulatory with a nontoxic appearance.  Denies any fever, chills, nausea, vomiting, diarrhea, shortness of breath or chest pain.  Patient right ear impacted with large amount of cerumen, removed via curette.  TM erythematous and bulging.  Denies any hearing loss.  Denies any tinnitus.  Denies any dizziness.  Patient to take amoxicillin as ordered.  Alternate tylenol and ibuprofen as needed for pain.  Follow-up with primary care provider or return to urgent care/ED with any worsening in condition or additional concerns.  Patient is in agreement with treatment plan.    Discharge Medication List as of 12/2/2022  4:59 PM      START taking these medications    Details   amoxicillin (AMOXIL) 875 MG tablet Take 1 tablet (875 mg) by mouth 2 times daily for 10 days, Disp-20 tablet, R-0, E-Prescribe           Final diagnoses:   Impacted cerumen of right ear   Right otitis media     12/2/2022   [facility]     Pamela Jade, RODRIGUEZ  12/02/22 3339

## 2023-10-05 ENCOUNTER — HOSPITAL ENCOUNTER (EMERGENCY)
Facility: HOSPITAL | Age: 58
Discharge: HOME | End: 2023-10-05
Admitting: NURSE PRACTITIONER
Payer: MEDICARE

## 2023-10-05 PROCEDURE — 999N000104 HC STATISTIC NO CHARGE

## 2023-10-06 ENCOUNTER — HOSPITAL ENCOUNTER (EMERGENCY)
Facility: HOSPITAL | Age: 58
Discharge: HOME | End: 2023-10-06
Attending: NURSE PRACTITIONER | Admitting: NURSE PRACTITIONER
Payer: MEDICARE

## 2023-10-06 VITALS
OXYGEN SATURATION: 100 % | TEMPERATURE: 97.6 F | RESPIRATION RATE: 18 BRPM | SYSTOLIC BLOOD PRESSURE: 138 MMHG | HEART RATE: 66 BPM | DIASTOLIC BLOOD PRESSURE: 84 MMHG

## 2023-10-06 DIAGNOSIS — L03.032 CELLULITIS OF GREAT TOE, LEFT: Primary | ICD-10-CM

## 2023-10-06 PROCEDURE — G0463 HOSPITAL OUTPT CLINIC VISIT: HCPCS

## 2023-10-06 PROCEDURE — 99213 OFFICE O/P EST LOW 20 MIN: CPT | Performed by: NURSE PRACTITIONER

## 2023-10-06 ASSESSMENT — ENCOUNTER SYMPTOMS
DIARRHEA: 0
VOMITING: 0
NAUSEA: 0
PSYCHIATRIC NEGATIVE: 1
FEVER: 0
CHILLS: 0
SHORTNESS OF BREATH: 0

## 2023-10-06 NOTE — ED TRIAGE NOTES
Pt presents with c/o wound care  Left big toe.   Is on abx pills and Bactroban ointment.  States that he is unable to apply the cream his self.       Soaked, applied triple abx oint, applied 2x2 gauze and wrapped big toe

## 2023-10-06 NOTE — ED PROVIDER NOTES
History   No chief complaint on file.    HPI  Bradley Garcia is a 57 year old male who presents to urgent care today (ambulatory) with complaints of mild redness and swelling to left great toe around nail.  Patient was seen by Dr. Eric Carlson two days ago at [facility] and ankle where patient had a portion of the nail removed and was started on Cephalexin QID and mupirocin ointment.  No current drainage.  Full ROM of left foot/ankle.  Denies any fever, chills, nausea, vomiting, diarrhea, SOB or chest pain.  Patient states pain and redness improving, brother and  made him come in to get it looked at.  Spoke with  on phone, she wanted him to come in to be seen as she was unable to see the wound during last visit.  Patient diabetic.  Has follow up appointment with Dr. Eric Carlson on 10/18/2023.  No other concerns.     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 [facility]- record.   IMO Update 10/11          Past Medical History:    No past medical history on file.    Past Surgical History:    Past Surgical History:   Procedure Laterality Date    MOUTH SURGERY      oral surgery       Family History:    Family History   Problem Relation Age of Onset    Alcoholism Father     Cancer Father     Diabetes Brother     Diabetes Cousin        Social History:  Marital Status:  Single [1]  Social History     Tobacco Use    Smoking status: Never    Smokeless tobacco: Never   Substance Use Topics    Alcohol  use: Yes     Comment: beer and whisky     Drug use: No        Medications:    ASPIRIN PO  baclofen (LIORESAL) 10 MG tablet  Blood Glucose Monitoring Suppl (BLOOD GLUCOSE TEST STRIPS STRP)  glyBURIDE (DIABETA /MICRONASE) 5 MG tablet  lancets misc. KIT  Levothyroxine Sodium 50 MCG CAPS  METFORMIN HCL PO  naltrexone (DEPADE/REVIA) 50 MG tablet  naproxen (NAPROSYN) 500 MG tablet  omeprazole (PRILOSEC) 20 MG DR capsule      Review of Systems   Constitutional:  Negative for chills and fever.   Respiratory:  Negative for shortness of breath.    Cardiovascular:  Negative for chest pain.   Gastrointestinal:  Negative for diarrhea, nausea and vomiting.   Musculoskeletal:  Negative for gait problem.   Skin:         Mild redness and swelling to left great toe around nail   Psychiatric/Behavioral: Negative.       Physical Exam     Physical Exam  Vitals and nursing note reviewed.   Constitutional:       General: He is not in acute distress.     Appearance: Normal appearance. He is not ill-appearing or toxic-appearing.   Cardiovascular:      Rate and Rhythm: Normal rate and regular rhythm.      Pulses: Normal pulses.      Heart sounds: Normal heart sounds.   Pulmonary:      Effort: Pulmonary effort is normal.      Breath sounds: Normal breath sounds.   Skin:     General: Skin is warm and dry.      Capillary Refill: Capillary refill takes less than 2 seconds.      Comments: Mild redness, no drainage around left great toenail.  Patient had paronychia due to ingrown toenail.  Small portion of toenail was removed 2 days ago, redness and pain improving per patient.    Neurological:      Mental Status: He is alert.   Psychiatric:         Mood and Affect: Mood normal.       ED Course     Procedures    No results found for this or any previous visit (from the past 24 hour(s)).    Medications - No data to display    Assessments & Plan (with Medical Decision Making)     I have reviewed the nursing notes.    I have reviewed the findings,  diagnosis, plan and need for follow up with the patient.  (L03.032) Cellulitis of great toe, left  (primary encounter diagnosis)  Plan:   Patient ambulatory with a nontoxic appearance.  Denies any fever, chills, nausea, vomiting, diarrhea, shortness of breath or chest pain.  Patient had a small portion of his left great toenail removed by Dr. Carlson at [facility] and ankle 2 days ago.  Currently taking cephalexin and mupirocin ointment.  No current drainage.  Full ROM.  Patient states redness and pain has been improving.  Patient was sent here by  and brother,  says she wanted him to be seen as she is unable to see the toe during last visit.  Do not see any need for imaging or change of medication at this time.  Patient to continue cephalexin as ordered, keep applying mupirocin ointment as ordered.  Go to scheduled follow-up appointment with Dr. Carlson on 10/18/2023.  Continue dressing changes as previously ordered by Dr. Carlson.  Follow-up with Dr. Mcgrath's office or return to the emergency department with any worsening in condition or additional concerns.  Patient in agreement with treatment plan.    New Prescriptions    No medications on file     Final diagnoses:   Cellulitis of great toe, left     10/6/2023   [facility]       Pamela Jade NP  10/06/23 1145

## 2023-10-06 NOTE — DISCHARGE INSTRUCTIONS
Continue cephalexin and mupirocin     Continue dressing changes previously ordered by Dr. Carlson    Follow up with primary care provider or return to urgent care/ED with any worsening in condition or additional concerns.

## 2023-10-08 ENCOUNTER — HOSPITAL ENCOUNTER (EMERGENCY)
Facility: HOSPITAL | Age: 58
Discharge: HOME | End: 2023-10-08
Admitting: NURSE PRACTITIONER
Payer: MEDICARE

## 2023-10-08 PROCEDURE — 999N000104 HC STATISTIC NO CHARGE

## 2023-10-08 NOTE — ED TRIAGE NOTES
Pt presents with c/o dressing change  No s/s of infection.  Toe cleaned, Bactroban applied, bandage applie.  Tolerated well

## 2023-10-10 ENCOUNTER — HOSPITAL ENCOUNTER (EMERGENCY)
Facility: HOSPITAL | Age: 58
Discharge: HOME | End: 2023-10-10
Admitting: NURSE PRACTITIONER
Payer: MEDICARE

## 2023-10-10 VITALS
HEART RATE: 74 BPM | HEIGHT: 71 IN | OXYGEN SATURATION: 98 % | SYSTOLIC BLOOD PRESSURE: 142 MMHG | BODY MASS INDEX: 44.1 KG/M2 | TEMPERATURE: 97.6 F | RESPIRATION RATE: 18 BRPM | DIASTOLIC BLOOD PRESSURE: 82 MMHG | WEIGHT: 315 LBS

## 2023-10-10 PROCEDURE — 999N000104 HC STATISTIC NO CHARGE

## 2023-10-10 NOTE — ED TRIAGE NOTES
Pt presents with c/o left big toe pain. Reports that big toe on left side is red, painful, and has drainage. Pt noticed sx a week ago. Hx of diabetes. Pt has been doing 2 foot soaks in plain water a day and applying mupirocin ointment with a band aid. Reports shashank was suppose to give him a nurse at home but has not yet. Pt is taking oral abx as well.

## 2023-10-10 NOTE — ED NOTES
Cleansed left big toe with NS, patted dry with sterile gauze, applied mupirocin ointment to affected area, and covered with band aid per patients request. Pt tolerated well.

## 2023-10-12 ENCOUNTER — HOSPITAL ENCOUNTER (EMERGENCY)
Facility: HOSPITAL | Age: 58
Discharge: HOME | End: 2023-10-12
Admitting: NURSE PRACTITIONER
Payer: MEDICARE

## 2023-10-12 VITALS
DIASTOLIC BLOOD PRESSURE: 86 MMHG | RESPIRATION RATE: 18 BRPM | TEMPERATURE: 98.1 F | SYSTOLIC BLOOD PRESSURE: 142 MMHG | OXYGEN SATURATION: 97 % | HEART RATE: 89 BPM

## 2023-10-12 PROCEDURE — 999N000104 HC STATISTIC NO CHARGE

## 2023-10-12 ASSESSMENT — ACTIVITIES OF DAILY LIVING (ADL): ADLS_ACUITY_SCORE: 35

## 2023-10-12 NOTE — ED NOTES
Left big toe was cleaned with NS and patted dry with sterile gauze. Triple abx ointment was applied because pt forgot his mupirocin ointment and wound was covered with bandaid. Pt tolerated well.

## 2023-10-16 ENCOUNTER — TELEPHONE (OUTPATIENT)
Dept: EMERGENCY MEDICINE | Facility: HOSPITAL | Age: 58
End: 2023-10-16

## 2023-10-16 NOTE — ED NOTES
Care Transitions focused note:      Staff message from Nursing to assist with PCP, Wound Care. Has been seen x6 in October for wound care.    Call to [facility] Care Coordinator, Jaqui Torres.  She stated that patient has been set up there but keeps rescheduling his appts.    She will reach out to patient to get a follow up for his wound care.    Will fax most recent ED note for continuity of care.    CAREY Durbin

## 2023-10-17 ENCOUNTER — HOSPITAL ENCOUNTER (EMERGENCY)
Facility: HOSPITAL | Age: 58
Discharge: HOME | End: 2023-10-17
Attending: NURSE PRACTITIONER | Admitting: NURSE PRACTITIONER
Payer: MEDICARE

## 2023-10-17 VITALS
TEMPERATURE: 97.7 F | HEART RATE: 76 BPM | DIASTOLIC BLOOD PRESSURE: 75 MMHG | RESPIRATION RATE: 19 BRPM | SYSTOLIC BLOOD PRESSURE: 136 MMHG | OXYGEN SATURATION: 96 %

## 2023-10-17 DIAGNOSIS — Z01.89 ENCOUNTER FOR BLOOD TEST: ICD-10-CM

## 2023-10-17 DIAGNOSIS — R04.0 EPISTAXIS: ICD-10-CM

## 2023-10-17 DIAGNOSIS — Z51.89 VISIT FOR WOUND CARE: Primary | ICD-10-CM

## 2023-10-17 LAB
EST. AVERAGE GLUCOSE BLD GHB EST-MCNC: 148 MG/DL
GLUCOSE BLDC GLUCOMTR-MCNC: 106 MG/DL (ref 70–99)
HBA1C MFR BLD: 6.8 %
HOLD SPECIMEN: NORMAL

## 2023-10-17 PROCEDURE — 82962 GLUCOSE BLOOD TEST: CPT

## 2023-10-17 PROCEDURE — 36415 COLL VENOUS BLD VENIPUNCTURE: CPT | Performed by: NURSE PRACTITIONER

## 2023-10-17 PROCEDURE — 99213 OFFICE O/P EST LOW 20 MIN: CPT | Performed by: NURSE PRACTITIONER

## 2023-10-17 PROCEDURE — G0463 HOSPITAL OUTPT CLINIC VISIT: HCPCS

## 2023-10-17 PROCEDURE — 83036 HEMOGLOBIN GLYCOSYLATED A1C: CPT | Performed by: NURSE PRACTITIONER

## 2023-10-17 RX ORDER — PIOGLITAZONEHYDROCHLORIDE 45 MG/1
TABLET ORAL
COMMUNITY
Start: 2023-10-16

## 2023-10-17 RX ORDER — SIMVASTATIN 20 MG
1 TABLET ORAL
COMMUNITY
Start: 2023-10-16

## 2023-10-17 RX ORDER — LISINOPRIL 40 MG/1
TABLET ORAL
COMMUNITY
Start: 2023-10-16

## 2023-10-17 RX ORDER — PRAZOSIN HYDROCHLORIDE 1 MG/1
CAPSULE ORAL
COMMUNITY
Start: 2023-10-16

## 2023-10-17 RX ORDER — MUPIROCIN 20 MG/G
OINTMENT TOPICAL
COMMUNITY
Start: 2023-10-04

## 2023-10-17 ASSESSMENT — ENCOUNTER SYMPTOMS
WOUND: 1
DIARRHEA: 0
VOMITING: 0
NAUSEA: 0
ABDOMINAL PAIN: 0

## 2023-10-17 ASSESSMENT — ACTIVITIES OF DAILY LIVING (ADL): ADLS_ACUITY_SCORE: 35

## 2023-10-17 NOTE — ED PROVIDER NOTES
"  History     Chief Complaint   Patient presents with    Dressing Change     Dressing change to left big toe and \"wants a blood test\".     HPI  Bradley Garcia is a 57 year old male who presents to urgent care for dressing change.  Is currently being treated for a left great toe infection and follows with Dr. Carlson at [facility].  He has been seen several times in this department for dressing changes he is unable to do these by himself at home.  Attempts have been made to have him follow-up with wound care at [facility] but it appears patient continues to cancel his appointments.  He returns today asking for his wound to be evaluated and to get dressing changed with mupirocin ointment applied.  He has an appointment with Dr. Carlson tomorrow.    Additionally, patient states he woke up this morning and had to bloody noses which resolved quickly.  No current blood thinner use.  Denies any recent head or face trauma.  No changes to his medications.  He would like to be evaluated for that.  He is also requesting his A1c blood test to be done today.  He tells me his doctor is on vacation so he is unable to see her today.  He states his last A1c was 9.  He is unable to check his blood sugar at home as the machine is not working.  No nausea, vomiting or diarrhea.  No concerning symptoms.  He would like his A1c result to be sent over to his doctor's office.    He is taking his prescription medications as prescribed.      Allergies:  No Known Allergies    Problem List:    Patient Active Problem List    Diagnosis Date Noted    Essential hypertension 05/11/2021     Priority: Medium    DM type 2 (diabetes mellitus, type 2) (H) 04/29/2013     Priority: Medium     Takes Metformin      Hyperlipidemia 04/22/2013     Priority: Medium    Hypothyroidism 07/22/2011     Priority: Medium    Obesity, unspecified 04/13/2010     Priority: Medium     Overview:   Updated per 10/1/17 IMO import      Mild intellectual disabilities " 09/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 [facility] record.   IMO Update 10/11          Past Medical History:    No past medical history on file.    Past Surgical History:    Past Surgical History:   Procedure Laterality Date    MOUTH SURGERY      oral surgery       Family History:    Family History   Problem Relation Age of Onset    Alcoholism Father     Cancer Father     Diabetes Brother     Diabetes Cousin        Social History:  Marital Status:  Single [1]  Social History     Tobacco Use    Smoking status: Never    Smokeless tobacco: Never   Substance Use Topics    Alcohol use: Yes     Comment: beer and whisky     Drug use: No        Medications:    ASPIRIN PO  baclofen (LIORESAL) 10 MG tablet  Blood Glucose Monitoring Suppl (BLOOD GLUCOSE TEST STRIPS STRP)  glyBURIDE (DIABETA /MICRONASE) 5 MG tablet  lancets misc. KIT  Levothyroxine Sodium 50 MCG CAPS  lisinopril (ZESTRIL) 40 MG tablet  METFORMIN HCL PO  mupirocin (BACTROBAN) 2 % external ointment  naltrexone (DEPADE/REVIA) 50 MG tablet  naproxen (NAPROSYN) 500 MG tablet  omeprazole (PRILOSEC) 20 MG DR capsule  pioglitazone (ACTOS) 45 MG tablet  prazosin (MINIPRESS) 1 MG capsule  simvastatin (ZOCOR) 20 MG tablet          Review of Systems   HENT:  Negative for nosebleeds (resolved).    Gastrointestinal:  Negative for abdominal pain, diarrhea, nausea and vomiting.   Skin:  Positive for wound.   All other systems reviewed and are negative.      Physical Exam   BP: 136/75  Pulse: 76  Temp: 97.7  F (36.5  C)  Resp: 19  SpO2: 96 %      Physical Exam  Vitals and nursing note reviewed.   Constitutional:       General: He is not in acute distress.     Appearance: He is well-developed. He is obese. He is not ill-appearing, toxic-appearing or diaphoretic.   HENT:      Head: Normocephalic and atraumatic.      Nose: Nose normal. No congestion or rhinorrhea.      Right Nostril: No foreign body, septal hematoma or occlusion.      Left Nostril: No foreign body, septal  hematoma or occlusion.      Right Turbinates: Not enlarged.      Left Turbinates: Not enlarged.      Comments: Scant dried blood to right nostril. No active nosebleed at this time.     Mouth/Throat:      Mouth: Mucous membranes are moist.   Eyes:      Pupils: Pupils are equal, round, and reactive to light.   Cardiovascular:      Rate and Rhythm: Normal rate and regular rhythm.      Pulses:           Dorsalis pedis pulses are 2+ on the left side.      Heart sounds: Normal heart sounds.   Pulmonary:      Effort: Pulmonary effort is normal. No respiratory distress.      Breath sounds: Normal breath sounds. No wheezing.   Abdominal:      General: Bowel sounds are normal.      Palpations: Abdomen is soft.      Tenderness: There is no abdominal tenderness. There is no guarding or rebound.   Musculoskeletal:      Cervical back: Normal range of motion and neck supple.   Feet:      Left foot:      Skin integrity: Skin integrity normal. No ulcer, blister, skin breakdown or erythema.      Comments: No redness, swelling or abnormal discharge to left great toe.  No significant tenderness appreciated.  Moving toes with minimal difficulty.  Cap refill less than 2 seconds.  Skin:     General: Skin is warm and dry.      Coloration: Skin is not pale.   Neurological:      Mental Status: He is alert and oriented to person, place, and time.         ED Course                 Procedures              Results for orders placed or performed during the hospital encounter of 10/17/23 (from the past 24 hour(s))   Glucose by meter   Result Value Ref Range    GLUCOSE BY METER POCT 106 (H) 70 - 99 mg/dL   Hemoglobin A1c   Result Value Ref Range    Estimated Average Glucose 148 mg/dL    Hemoglobin A1C 6.8 (H) <5.7 %   Extra Tube    Narrative    The following orders were created for panel order Extra Tube.  Procedure                               Abnormality         Status                     ---------                               -----------          ------                     Extra Green Top (Lithium...[[id number]]                      In process                   Please view results for these tests on the individual orders.       Medications - No data to display    Assessments & Plan (with Medical Decision Making)   57-year-old male that is currently being treated for a left great toe infection and presented here for dressing change.  Toe does not look infected.  He has a follow-up appointment at podiatry clinic tomorrow which she was strongly encouraged to keep.  His wound was cleaned and redressed during this visit.    Patient reported having 2 brief nosebleeds this morning.  He is noted to have scant dried blood to the right nare.  No active nosebleed at this time.  He is not on any blood thinners.  He denies any recent facial or head trauma.  Possible dry air could have contributed to it.  Advised patient to apply OTC saline drops to his nose or consider use of a humidifier in his house.  Follow-up with primary doctor if nosebleeding recurs.    Patient requested for A1c today so it can be sent to his primary doctor.  Primary doctor is out of the office and currently on vacation.  Patient does however have an appointment scheduled with his primary doctor next week but felt that he needed his A1c done today.  His A1c was done and noted to be 6.8.  Point-of-care glucose was noted to be 106.  Patient advised to keep scheduled appointment with his primary doctor next week this note will be forwarded to his primary doctor..    Return to urgent care or emergency department for any worsening or concerning symptoms.    I have reviewed the nursing notes.    I have reviewed the findings, diagnosis, plan and need for follow up with the patient.  This document was prepared using a combination of typing and voice generated software.  While every attempt was made for accuracy, spelling and grammatical errors may exist.         Discharge Medication List as of 10/17/2023  12:49 PM          Final diagnoses:   Visit for wound care   Encounter for blood test   Epistaxis       10/17/2023   [facility]       Bibi Soler CNP  10/17/23 5622

## 2023-10-17 NOTE — DISCHARGE INSTRUCTIONS
Your toe looks good.  No concerning signs of infection.  Keep your appointment with Dr. Carlson for tomorrow.    We will notify you about your A1c result when it is available.  It will be sent electronically to your doctor.  Keep your scheduled appointment with your doctor for next week.    Return to urgent care or emergency department for any worsening or concerning symptoms.

## 2023-10-17 NOTE — ED TRIAGE NOTES
Pt presents with c/o dressing change and blood work   States that he wants to get blood work for checking on his diabetes   Does have a follow up apt with dr Carlson tomorrow.  Is coming in for dressing change to his left great toe.

## 2023-10-18 ENCOUNTER — TELEPHONE (OUTPATIENT)
Dept: EMERGENCY MEDICINE | Facility: HOSPITAL | Age: 58
End: 2023-10-18

## 2023-10-18 NOTE — ED NOTES
ED Screening    PCP Yes:     Insurance Yes:     Medical Yes: wound care dressing changes    Mental Health No    Substance Use No    Interventions Yes: Referral to care coordinator at [facility]    Watch Yes:     Close No    Will follow, patient receives care at [facility]  Wound care and podiatry follow up in place.

## 2025-01-20 ENCOUNTER — APPOINTMENT (OUTPATIENT)
Dept: ULTRASOUND IMAGING | Facility: HOSPITAL | Age: 60
End: 2025-01-20
Attending: STUDENT IN AN ORGANIZED HEALTH CARE EDUCATION/TRAINING PROGRAM
Payer: MEDICARE

## 2025-01-20 ENCOUNTER — HOSPITAL ENCOUNTER (EMERGENCY)
Facility: HOSPITAL | Age: 60
Discharge: HOME | End: 2025-01-20
Attending: STUDENT IN AN ORGANIZED HEALTH CARE EDUCATION/TRAINING PROGRAM
Payer: MEDICARE

## 2025-01-20 VITALS
SYSTOLIC BLOOD PRESSURE: 168 MMHG | HEART RATE: 71 BPM | TEMPERATURE: 97.3 F | DIASTOLIC BLOOD PRESSURE: 88 MMHG | WEIGHT: 315 LBS | RESPIRATION RATE: 17 BRPM | OXYGEN SATURATION: 96 % | BODY MASS INDEX: 44.71 KG/M2

## 2025-01-20 DIAGNOSIS — H54.60 MONOCULAR VISION LOSS: ICD-10-CM

## 2025-01-20 LAB
ALBUMIN SERPL BCG-MCNC: 4 G/DL (ref 3.5–5.2)
ALP SERPL-CCNC: 81 U/L (ref 40–150)
ALT SERPL W P-5'-P-CCNC: 41 U/L (ref 0–70)
ANION GAP SERPL CALCULATED.3IONS-SCNC: 8 MMOL/L (ref 7–15)
AST SERPL W P-5'-P-CCNC: 40 U/L (ref 0–45)
BASOPHILS # BLD AUTO: 0.1 10E3/UL (ref 0–0.2)
BASOPHILS NFR BLD AUTO: 1 %
BILIRUB SERPL-MCNC: 0.5 MG/DL
BUN SERPL-MCNC: 25.4 MG/DL (ref 8–23)
CALCIUM SERPL-MCNC: 9.6 MG/DL (ref 8.8–10.4)
CHLORIDE SERPL-SCNC: 103 MMOL/L (ref 98–107)
CREAT SERPL-MCNC: 1.14 MG/DL (ref 0.67–1.17)
CRP SERPL-MCNC: 3.45 MG/L
EGFRCR SERPLBLD CKD-EPI 2021: 73 ML/MIN/1.73M2
EOSINOPHIL # BLD AUTO: 0.3 10E3/UL (ref 0–0.7)
EOSINOPHIL NFR BLD AUTO: 3 %
ERYTHROCYTE [DISTWIDTH] IN BLOOD BY AUTOMATED COUNT: 13.4 % (ref 10–15)
ERYTHROCYTE [SEDIMENTATION RATE] IN BLOOD BY WESTERGREN METHOD: 44 MM/HR (ref 0–20)
EST. AVERAGE GLUCOSE BLD GHB EST-MCNC: 197 MG/DL
GLUCOSE SERPL-MCNC: 193 MG/DL (ref 70–99)
HBA1C MFR BLD: 8.5 %
HCO3 SERPL-SCNC: 22 MMOL/L (ref 22–29)
HCT VFR BLD AUTO: 41.5 % (ref 40–53)
HGB BLD-MCNC: 14.5 G/DL (ref 13.3–17.7)
HOLD SPECIMEN: NORMAL
IMM GRANULOCYTES # BLD: 0 10E3/UL
IMM GRANULOCYTES NFR BLD: 1 %
LYMPHOCYTES # BLD AUTO: 2.5 10E3/UL (ref 0.8–5.3)
LYMPHOCYTES NFR BLD AUTO: 29 %
MCH RBC QN AUTO: 32.4 PG (ref 26.5–33)
MCHC RBC AUTO-ENTMCNC: 34.9 G/DL (ref 31.5–36.5)
MCV RBC AUTO: 93 FL (ref 78–100)
MONOCYTES # BLD AUTO: 0.7 10E3/UL (ref 0–1.3)
MONOCYTES NFR BLD AUTO: 9 %
NEUTROPHILS # BLD AUTO: 4.8 10E3/UL (ref 1.6–8.3)
NEUTROPHILS NFR BLD AUTO: 57 %
NRBC # BLD AUTO: 0 10E3/UL
NRBC BLD AUTO-RTO: 0 /100
PLATELET # BLD AUTO: 213 10E3/UL (ref 150–450)
POTASSIUM SERPL-SCNC: 4.3 MMOL/L (ref 3.4–5.3)
PROT SERPL-MCNC: 8.5 G/DL (ref 6.4–8.3)
RBC # BLD AUTO: 4.47 10E6/UL (ref 4.4–5.9)
SODIUM SERPL-SCNC: 133 MMOL/L (ref 135–145)
WBC # BLD AUTO: 8.4 10E3/UL (ref 4–11)

## 2025-01-20 PROCEDURE — 80051 ELECTROLYTE PANEL: CPT | Performed by: STUDENT IN AN ORGANIZED HEALTH CARE EDUCATION/TRAINING PROGRAM

## 2025-01-20 PROCEDURE — 99284 EMERGENCY DEPT VISIT MOD MDM: CPT | Mod: 25

## 2025-01-20 PROCEDURE — 85652 RBC SED RATE AUTOMATED: CPT | Performed by: STUDENT IN AN ORGANIZED HEALTH CARE EDUCATION/TRAINING PROGRAM

## 2025-01-20 PROCEDURE — 76512 OPH US DX B-SCAN: CPT | Mod: TC,RT

## 2025-01-20 PROCEDURE — 85025 COMPLETE CBC W/AUTO DIFF WBC: CPT | Performed by: STUDENT IN AN ORGANIZED HEALTH CARE EDUCATION/TRAINING PROGRAM

## 2025-01-20 PROCEDURE — 36415 COLL VENOUS BLD VENIPUNCTURE: CPT | Performed by: STUDENT IN AN ORGANIZED HEALTH CARE EDUCATION/TRAINING PROGRAM

## 2025-01-20 PROCEDURE — 86140 C-REACTIVE PROTEIN: CPT | Performed by: STUDENT IN AN ORGANIZED HEALTH CARE EDUCATION/TRAINING PROGRAM

## 2025-01-20 PROCEDURE — 83036 HEMOGLOBIN GLYCOSYLATED A1C: CPT | Performed by: STUDENT IN AN ORGANIZED HEALTH CARE EDUCATION/TRAINING PROGRAM

## 2025-01-20 PROCEDURE — 76512 OPH US DX B-SCAN: CPT | Mod: 26 | Performed by: STUDENT IN AN ORGANIZED HEALTH CARE EDUCATION/TRAINING PROGRAM

## 2025-01-20 PROCEDURE — 82040 ASSAY OF SERUM ALBUMIN: CPT | Performed by: STUDENT IN AN ORGANIZED HEALTH CARE EDUCATION/TRAINING PROGRAM

## 2025-01-20 PROCEDURE — 99284 EMERGENCY DEPT VISIT MOD MDM: CPT | Mod: 25 | Performed by: STUDENT IN AN ORGANIZED HEALTH CARE EDUCATION/TRAINING PROGRAM

## 2025-01-20 ASSESSMENT — ACTIVITIES OF DAILY LIVING (ADL)
ADLS_ACUITY_SCORE: 41

## 2025-01-20 ASSESSMENT — COLUMBIA-SUICIDE SEVERITY RATING SCALE - C-SSRS
2. HAVE YOU ACTUALLY HAD ANY THOUGHTS OF KILLING YOURSELF IN THE PAST MONTH?: NO
6. HAVE YOU EVER DONE ANYTHING, STARTED TO DO ANYTHING, OR PREPARED TO DO ANYTHING TO END YOUR LIFE?: NO
1. IN THE PAST MONTH, HAVE YOU WISHED YOU WERE DEAD OR WISHED YOU COULD GO TO SLEEP AND NOT WAKE UP?: NO

## 2025-01-20 ASSESSMENT — VISUAL ACUITY
OS: 20/70;WITHOUT CORRECTIVE LENSES
OD: OTHER (SEE COMMENTS)

## 2025-01-20 NOTE — ED TRIAGE NOTES
SUNNY Jade CNP assessed patient in triage and determined patient not Urgent Care appropriate. Will be seen in Emergency Department.

## 2025-01-20 NOTE — DISCHARGE INSTRUCTIONS
Return to the emergency department for worsening symptoms or new concerning symptoms.  Follow-up with the ophthalmologist within the next 48 hours.  Call to schedule an appointment.

## 2025-01-20 NOTE — ED PROVIDER NOTES
[facility]  ED Provider Note    Chief Complaint   Patient presents with    Vision Changes Od     History:  Bradley Garcia is a 59 year old male with history of diabetes presents to the emergency department today complaining of right sided loss of vision that happened about 2 months ago and has not been improving.  He states he has perfectly normal vision of his left eye for him.  No fevers, no trauma, no pain.  No numbness weakness    Review of Systems   Performed; see HPI for pertinent positives and negatives.     Medical history, surgical history, and social history was reviewed.  Nursing documentation, triage note, and vitals were reviewed.    Vitals:  BP: 168/88  Pulse: 71  Temp: 97.3  F (36.3  C)  Resp: 16  Weight: 145.4 kg (320 lb 8.8 oz)  SpO2: 96 %    Physical Exam:  Constitutional: Alert and conversant. NAD   HENT: NCAT   Eyes: Normal pupils. EOMI, no obvious red reflex of right eye, red reflex normal with left eye with normal visualization of the blood vessels.    Neck: supple   CV: No pallor  Pulmonary/Chest: Non-labored respirations  Abdominal: non-distended   MSK: OROURKE.   Neuro: Alert and appropriate   Skin: Warm and dry. No diaphoresis. No rashes on exposed skin    Psych: Appropriate mood and affect       MDM:      ED Course as of 01/20/25 1431   Mon Jan 20, 2025   1428 59-year-old male with complaints of loss of vision in his right eye 2 months ago.  There is no red reflex on exam.  No headache to suggest GCA or optic neuritis or glaucoma.  Ultrasound performed here without clear evidence of retinal detachment.  There is certainly quite a bit of blood in the eye consistent with vitreous hemorrhage though the possibility of vitreous detachment remains.  Patient required close ophthalmologic follow-up, but this did happened 2 months ago and the likelihood of things changing in the next couple of days is fairly low.  Regardless we were able to secure an appointment at [facility]  tomorrow, we greatly appreciate their assistance   [id number] There is no evidence at this time of a specific medical problem that requires transfer, admission or immediate surgery or other intervention.  Therefore this patient is appropriate for further outpatient management, discharged in stable additional questions answered and return precautions given.  At the patient's request I did describe all this to the patient's sister who will help make sure the patient gets where he needs to be       Procedures:  Procedures        Impression:  Final diagnoses:   Monocular vision loss            Mukul Gomez MD  01/20/25 1431

## 2025-01-20 NOTE — ED NOTES
Care Transitions focused note:      ED provider requesting eye appointment for new vision changes in right eye.    Call to [facility] in [place].    Appointment made with Dr Rizzo at the [facility] on Tuesday Jan 21st at 1pm    CAREY Durbin

## 2025-01-21 ENCOUNTER — TRANSFERRED RECORDS (OUTPATIENT)
Dept: HEALTH INFORMATION MANAGEMENT | Facility: CLINIC | Age: 60
End: 2025-01-21

## 2025-01-21 LAB — RETINOPATHY: NEGATIVE

## 2025-01-24 ENCOUNTER — TRANSFERRED RECORDS (OUTPATIENT)
Dept: HEALTH INFORMATION MANAGEMENT | Facility: CLINIC | Age: 60
End: 2025-01-24

## 2025-01-24 LAB — RETINOPATHY: NORMAL

## 2025-02-10 ENCOUNTER — TRANSFERRED RECORDS (OUTPATIENT)
Dept: HEALTH INFORMATION MANAGEMENT | Facility: CLINIC | Age: 60
End: 2025-02-10

## 2025-02-20 ENCOUNTER — ANESTHESIA EVENT (OUTPATIENT)
Dept: SURGERY | Facility: HOSPITAL | Age: 60
End: 2025-02-20

## 2025-02-20 NOTE — ANESTHESIA PREPROCEDURE EVALUATION
Anesthesia Pre-Procedure Evaluation    Patient: Bradley Garcia   MRN: [id number] : 1965        Procedure : Procedure(s):  Phacoemulsification Cataract Extraction Posterior Chamber Lens Right Eye          No past medical history on file.   Past Surgical History:   Procedure Laterality Date     MOUTH SURGERY      oral surgery      No Known Allergies   Social History     Tobacco Use     Smoking status: Never     Smokeless tobacco: Never   Substance Use Topics     Alcohol use: Yes     Comment: beer and whisky       Wt Readings from Last 1 Encounters:   25 145.4 kg (320 lb 8.8 oz)        Anesthesia Evaluation   Pt has had prior anesthetic.         ROS/MED HX  ENT/Pulmonary:     (+)     NOAH risk factors,  hypertension, obese,                                Neurologic:     (+)                         Developmental delay, level of function: Mild intellectual disabilities- - reading,      Cardiovascular:     (+) Dyslipidemia hypertension-range: lisinopril (128/80 2/10/25)/ -   -  - -   Taking blood thinners  Instructions Given to patient: asa 81.                            Previous cardiac testing   Echo: Date: Results:    Stress Test:  Date: Results:    ECG Reviewed:  Date: 2/10/25 Results:  Sinus with 1st degree AV block and possible left atrial enlargement.   Cath:  Date: Results:      METS/Exercise Tolerance: >4 METS    Hematologic: Comments: Sodium 129 (2/10/25) - likely r/t drinking case of water in 3 days (recheck lab next week)  25 sodium 139      Musculoskeletal:  - neg musculoskeletal ROS     GI/Hepatic:     (+) GERD,                   Renal/Genitourinary: Comment: Per 2/10/25 note: Recently noted renal insufficiency and he has increased his fluid intake.  Creatinine 1.47 (25)  Creatinine 0.99 (2/10/25)      Endo: Comment: Previous A1C was 10.8 on 7/23/24    2/10/25 BMI: 44.9    (+) type I DM, type II DM, Last HgA1c: 8.9, date: 25, Not using insulin,     thyroid problem,  "hypothyroidism TSH elevated 5.077 1/21/25 (synthroid increased, recheck TSH in 2 months),    Obesity,       Psychiatric/Substance Use: Comment: Naltrexone (2/10/25)    (+) psychiatric history anxiety alcohol abuse      Infectious Disease:  - neg infectious disease ROS     Malignancy:  - neg malignancy ROS     Other:  - neg other ROS             OUTSIDE LABS:  CBC:   Lab Results   Component Value Date    WBC 8.4 01/20/2025    WBC 10.4 03/02/2019    HGB 14.5 01/20/2025    HGB 14.9 03/02/2019    HCT 41.5 01/20/2025    HCT 42.5 03/02/2019     01/20/2025     03/02/2019     BMP:   Lab Results   Component Value Date     (L) 01/20/2025     03/02/2019    POTASSIUM 4.3 01/20/2025    POTASSIUM 3.9 03/02/2019    CHLORIDE 103 01/20/2025    CHLORIDE 104 03/02/2019    CO2 22 01/20/2025    CO2 19 (L) 03/02/2019    BUN 25.4 (H) 01/20/2025    BUN 12 03/02/2019    CR 1.14 01/20/2025    CR 0.82 03/02/2019     (H) 01/20/2025     (H) 10/17/2023     COAGS: No results found for: \"PTT\", \"INR\", \"FIBR\"  POC: No results found for: \"BGM\", \"HCG\", \"HCGS\"  HEPATIC:   Lab Results   Component Value Date    ALBUMIN 4.0 01/20/2025    PROTTOTAL 8.5 (H) 01/20/2025    ALT 41 01/20/2025    AST 40 01/20/2025    ALKPHOS 81 01/20/2025    BILITOTAL 0.5 01/20/2025     OTHER:   Lab Results   Component Value Date    A1C 8.5 (H) 01/20/2025    AMAYA 9.6 01/20/2025    LIPASE 967 (H) 12/02/2017    TSH 2.20 03/02/2019    SED 44 (H) 01/20/2025       Anesthesia Plan    ASA Status:  3                     Consents            Postoperative Care            Comments:    Other Comments: Reviewed 2/10 [facility] - was cleared for surgery, though recheck of sodium ordered    Sodium of 129 at [facility] with repeat 2/19/25 of 139    On naltrexone for hx ETOH abuse           BERLIN Martin CNP    I have reviewed the pertinent notes and labs in the chart from the past 30 days.  Any updates or changes from those notes are reflected in " this note.    Clinically Significant Risk Factors Present on Admission                   # Hypertension: Noted on problem list          # DMII: A1C = 8.5 % (Ref range: <5.7 %) within past 6 months

## 2025-02-22 RX ORDER — DEXAMETHASONE SODIUM PHOSPHATE 10 MG/ML
4 INJECTION, SOLUTION INTRAMUSCULAR; INTRAVENOUS
Status: CANCELLED | OUTPATIENT
Start: 2025-02-22

## 2025-02-22 RX ORDER — ONDANSETRON 2 MG/ML
4 INJECTION INTRAMUSCULAR; INTRAVENOUS EVERY 30 MIN PRN
Status: CANCELLED | OUTPATIENT
Start: 2025-02-22

## 2025-02-22 RX ORDER — NALOXONE HYDROCHLORIDE 0.4 MG/ML
0.1 INJECTION, SOLUTION INTRAMUSCULAR; INTRAVENOUS; SUBCUTANEOUS
Status: CANCELLED | OUTPATIENT
Start: 2025-02-22

## 2025-02-22 RX ORDER — ONDANSETRON 4 MG/1
4 TABLET, ORALLY DISINTEGRATING ORAL EVERY 30 MIN PRN
Status: CANCELLED | OUTPATIENT
Start: 2025-02-22

## 2025-02-24 NOTE — OR NURSING
Patient came in person to speak with PAT nurse.   Discussed with patient pre-op information.  He states he does not set up his own meds and that his sister-in-law Emiliana sets up his medications.  He also states his sister Maira helps him out medications and appointments.   Patient agreeable to sign authorization to Share protected health information.  Faxed completed sheet to medical records.

## 2025-02-25 RX ORDER — GLIPIZIDE 10 MG/1
10 TABLET ORAL
COMMUNITY

## 2025-02-25 RX ORDER — LEVOTHYROXINE SODIUM 88 UG/1
88 TABLET ORAL
COMMUNITY

## 2025-02-25 RX ORDER — CALCIUM CARBONATE/VITAMIN D3 600 MG-10
1 TABLET ORAL 2 TIMES DAILY
COMMUNITY

## 2025-02-27 ENCOUNTER — ANESTHESIA (OUTPATIENT)
Dept: SURGERY | Facility: HOSPITAL | Age: 60
End: 2025-02-27

## 2025-02-27 ENCOUNTER — HOSPITAL ENCOUNTER (OUTPATIENT)
Facility: HOSPITAL | Age: 60
Discharge: HOME | End: 2025-02-27
Attending: STUDENT IN AN ORGANIZED HEALTH CARE EDUCATION/TRAINING PROGRAM | Admitting: STUDENT IN AN ORGANIZED HEALTH CARE EDUCATION/TRAINING PROGRAM
Payer: MEDICARE

## 2025-02-27 RX ORDER — PROPARACAINE HYDROCHLORIDE 5 MG/ML
1 SOLUTION/ DROPS OPHTHALMIC
Status: DISCONTINUED | OUTPATIENT
Start: 2025-02-27 | End: 2025-02-27 | Stop reason: HOSPADM

## 2025-02-27 RX ORDER — LIDOCAINE 40 MG/G
CREAM TOPICAL
Status: DISCONTINUED | OUTPATIENT
Start: 2025-02-27 | End: 2025-02-27 | Stop reason: HOSPADM

## 2025-02-27 RX ORDER — PHENYLEPHRINE HYDROCHLORIDE 100 MG/ML
1 SOLUTION/ DROPS OPHTHALMIC
Status: DISCONTINUED | OUTPATIENT
Start: 2025-02-27 | End: 2025-02-27 | Stop reason: HOSPADM

## 2025-02-27 RX ORDER — ACETAMINOPHEN 325 MG/1
650 TABLET ORAL
Status: DISCONTINUED | OUTPATIENT
Start: 2025-02-27 | End: 2025-02-27 | Stop reason: HOSPADM

## 2025-02-27 RX ORDER — SODIUM CHLORIDE, SODIUM LACTATE, POTASSIUM CHLORIDE, CALCIUM CHLORIDE 600; 310; 30; 20 MG/100ML; MG/100ML; MG/100ML; MG/100ML
INJECTION, SOLUTION INTRAVENOUS CONTINUOUS
Status: DISCONTINUED | OUTPATIENT
Start: 2025-02-27 | End: 2025-02-27 | Stop reason: HOSPADM

## 2025-02-27 RX ORDER — PROPARACAINE HYDROCHLORIDE 5 MG/ML
1 SOLUTION/ DROPS OPHTHALMIC ONCE
Status: DISCONTINUED | OUTPATIENT
Start: 2025-02-27 | End: 2025-02-27 | Stop reason: HOSPADM

## 2025-02-27 RX ORDER — CYCLOPENTOLAT/TROPIC/PHENYLEPH 1%-1%-2.5%
1 DROPS (EA) OPHTHALMIC (EYE)
Status: DISCONTINUED | OUTPATIENT
Start: 2025-02-27 | End: 2025-02-27 | Stop reason: HOSPADM

## 2025-02-27 ASSESSMENT — ACTIVITIES OF DAILY LIVING (ADL)
ADLS_ACUITY_SCORE: 15
ADLS_ACUITY_SCORE: 15

## 2025-02-27 NOTE — OR NURSING
Procedure cancelled.  Dr. Reveles's office will call to reschedule. Pt and pt's sister Maira agree to have Dr. Reveles's schedulers  call sister Maira to reschedule.  Maira's # [phone]

## 2025-03-11 ENCOUNTER — TRANSFERRED RECORDS (OUTPATIENT)
Dept: HEALTH INFORMATION MANAGEMENT | Facility: CLINIC | Age: 60
End: 2025-03-11

## 2025-03-21 ENCOUNTER — ANESTHESIA EVENT (OUTPATIENT)
Dept: SURGERY | Facility: HOSPITAL | Age: 60
End: 2025-03-21
Payer: MEDICARE

## 2025-03-21 NOTE — ANESTHESIA PREPROCEDURE EVALUATION
Anesthesia Pre-Procedure Evaluation    Patient: Bradley Garcia   MRN: [id number] : 1965        Procedure : Procedure(s):  Phacoemulsification cataract extraction posterior chamber lens right eye          No past medical history on file.   Past Surgical History:   Procedure Laterality Date     MOUTH SURGERY      oral surgery      No Known Allergies   Social History     Tobacco Use     Smoking status: Never     Smokeless tobacco: Never   Substance Use Topics     Alcohol use: Yes     Comment: beer and whisky       Wt Readings from Last 1 Encounters:   25 145.4 kg (320 lb 8.8 oz)        Anesthesia Evaluation   Pt has had prior anesthetic. Type: General.    No history of anesthetic complications       ROS/MED HX  ENT/Pulmonary:     (+)     NOAH risk factors,  hypertension, obese,                                Neurologic:     (+)                         Developmental delay, level of function: Mild intellectual disabilities- - reading,      Cardiovascular:     (+) Dyslipidemia hypertension-range: lisinopril (110/62 on 3/11/25)/ -   -  - -   Taking blood thinners  Instructions Given to patient: asa 81.                            Previous cardiac testing   Echo: Date: Results:    Stress Test:  Date: Results:    ECG Reviewed:  Date: 2/10/25 Results:  Sinus with 1st degree AV block and possible left atrial enlargement.   Cath:  Date: Results:      METS/Exercise Tolerance: >4 METS    Hematologic:     (+)      anemia,          Musculoskeletal:  - neg musculoskeletal ROS     GI/Hepatic:     (+) GERD (no meds - managed per 3/11/25 [facility]), Other,                  Renal/Genitourinary:  - neg Renal ROS     Endo: Comment: Previous A1C was 10.8 on 7/23/24    2/10/25 BMI: 44.9    (+)  type II DM, Last HgA1c: 8.9, date: 25, Not using insulin,     thyroid problem, hypothyroidism TSH elevated 5.077 25 (synthroid increased, recheck TSH in 2 months),    Obesity,       Psychiatric/Substance Use: Comment: Naltrexone  "(2/10/25)    (+) psychiatric history anxiety alcohol abuse      Infectious Disease:  - neg infectious disease ROS     Malignancy:  - neg malignancy ROS     Other:  - neg other ROS          Physical Exam    Airway        Mallampati: III   TM distance: > 3 FB   Neck ROM: full   Mouth opening: > 3 cm    Respiratory Devices and Support         Dental       (+) Modest Abnormalities - crowns, retainers, 1 or 2 missing teeth      Cardiovascular   cardiovascular exam normal          Pulmonary   pulmonary exam normal            OUTSIDE LABS:  CBC:   Lab Results   Component Value Date    WBC 8.4 01/20/2025    WBC 10.4 03/02/2019    HGB 14.5 01/20/2025    HGB 14.9 03/02/2019    HCT 41.5 01/20/2025    HCT 42.5 03/02/2019     01/20/2025     03/02/2019     BMP:   Lab Results   Component Value Date     (L) 01/20/2025     03/02/2019    POTASSIUM 4.3 01/20/2025    POTASSIUM 3.9 03/02/2019    CHLORIDE 103 01/20/2025    CHLORIDE 104 03/02/2019    CO2 22 01/20/2025    CO2 19 (L) 03/02/2019    BUN 25.4 (H) 01/20/2025    BUN 12 03/02/2019    CR 1.14 01/20/2025    CR 0.82 03/02/2019     (H) 01/20/2025     (H) 10/17/2023     COAGS: No results found for: \"PTT\", \"INR\", \"FIBR\"  POC: No results found for: \"BGM\", \"HCG\", \"HCGS\"  HEPATIC:   Lab Results   Component Value Date    ALBUMIN 4.0 01/20/2025    PROTTOTAL 8.5 (H) 01/20/2025    ALT 41 01/20/2025    AST 40 01/20/2025    ALKPHOS 81 01/20/2025    BILITOTAL 0.5 01/20/2025     OTHER:   Lab Results   Component Value Date    A1C 8.5 (H) 01/20/2025    AMAYA 9.6 01/20/2025    LIPASE 967 (H) 12/02/2017    TSH 2.20 03/02/2019    SED 44 (H) 01/20/2025       Anesthesia Plan    ASA Status:  3    NPO Status:  NPO Appropriate    Anesthesia Type: MAC.   Induction: N/a.   Maintenance: N/A.        Consents    Anesthesia Plan(s) and associated risks, benefits, and realistic alternatives discussed. Questions answered and patient/representative(s) expressed understanding.   "   - Discussed: Risks, Benefits and Alternatives for BOTH SEDATION and the PROCEDURE were discussed     - Discussed with:  Patient      - Extended Intubation/Ventilatory Support Discussed: No.      - Patient is DNR/DNI Status: No     Use of blood products discussed: No .     Postoperative Care            Comments:    Other Comments: Reviewed Lonnie ALANIZ 3/11     2/27/25 procedure cancelled as pt drank coffee/creamer that morning    On naltrexone for hx ETOH abuse           Quin Mendenhall, APRN CNP    Clinically Significant Risk Factors Present on Admission                   # Hypertension: Noted on problem list          # DMII: A1C = 8.5 % (Ref range: <5.7 %) within past 6 months

## 2025-03-27 ENCOUNTER — HOSPITAL ENCOUNTER (OUTPATIENT)
Facility: HOSPITAL | Age: 60
Discharge: HOME | End: 2025-03-27
Attending: STUDENT IN AN ORGANIZED HEALTH CARE EDUCATION/TRAINING PROGRAM | Admitting: STUDENT IN AN ORGANIZED HEALTH CARE EDUCATION/TRAINING PROGRAM
Payer: MEDICARE

## 2025-03-27 ENCOUNTER — ANESTHESIA (OUTPATIENT)
Dept: SURGERY | Facility: HOSPITAL | Age: 60
End: 2025-03-27
Payer: MEDICARE

## 2025-03-27 VITALS
BODY MASS INDEX: 44.1 KG/M2 | TEMPERATURE: 96.8 F | HEIGHT: 71 IN | WEIGHT: 315 LBS | HEART RATE: 71 BPM | RESPIRATION RATE: 18 BRPM | DIASTOLIC BLOOD PRESSURE: 77 MMHG | OXYGEN SATURATION: 98 % | SYSTOLIC BLOOD PRESSURE: 117 MMHG

## 2025-03-27 LAB — GLUCOSE BLDC GLUCOMTR-MCNC: 100 MG/DL (ref 70–99)

## 2025-03-27 PROCEDURE — 250N000009 HC RX 250: Performed by: STUDENT IN AN ORGANIZED HEALTH CARE EDUCATION/TRAINING PROGRAM

## 2025-03-27 PROCEDURE — 710N000012 HC RECOVERY PHASE 2, PER MINUTE: Performed by: STUDENT IN AN ORGANIZED HEALTH CARE EDUCATION/TRAINING PROGRAM

## 2025-03-27 PROCEDURE — 82962 GLUCOSE BLOOD TEST: CPT

## 2025-03-27 PROCEDURE — 250N000011 HC RX IP 250 OP 636: Performed by: NURSE ANESTHETIST, CERTIFIED REGISTERED

## 2025-03-27 PROCEDURE — V2788 PRESBYOPIA-CORRECT FUNCTION: HCPCS | Performed by: STUDENT IN AN ORGANIZED HEALTH CARE EDUCATION/TRAINING PROGRAM

## 2025-03-27 PROCEDURE — 999N000141 HC STATISTIC PRE-PROCEDURE NURSING ASSESSMENT: Performed by: STUDENT IN AN ORGANIZED HEALTH CARE EDUCATION/TRAINING PROGRAM

## 2025-03-27 PROCEDURE — 370N000017 HC ANESTHESIA TECHNICAL FEE, PER MIN: Performed by: STUDENT IN AN ORGANIZED HEALTH CARE EDUCATION/TRAINING PROGRAM

## 2025-03-27 PROCEDURE — 360N000076 HC SURGERY LEVEL 3, PER MIN: Performed by: STUDENT IN AN ORGANIZED HEALTH CARE EDUCATION/TRAINING PROGRAM

## 2025-03-27 PROCEDURE — 250N000011 HC RX IP 250 OP 636: Performed by: STUDENT IN AN ORGANIZED HEALTH CARE EDUCATION/TRAINING PROGRAM

## 2025-03-27 PROCEDURE — 272N000001 HC OR GENERAL SUPPLY STERILE: Performed by: STUDENT IN AN ORGANIZED HEALTH CARE EDUCATION/TRAINING PROGRAM

## 2025-03-27 DEVICE — IMPLANTABLE DEVICE: Type: IMPLANTABLE DEVICE | Site: EYE | Status: FUNCTIONAL

## 2025-03-27 RX ORDER — PROPARACAINE HYDROCHLORIDE 5 MG/ML
1 SOLUTION/ DROPS OPHTHALMIC ONCE
Status: COMPLETED | OUTPATIENT
Start: 2025-03-27 | End: 2025-03-27

## 2025-03-27 RX ORDER — TETRACAINE HYDROCHLORIDE 5 MG/ML
SOLUTION OPHTHALMIC PRN
Status: DISCONTINUED | OUTPATIENT
Start: 2025-03-27 | End: 2025-03-27 | Stop reason: HOSPADM

## 2025-03-27 RX ORDER — CYCLOPENTOLAT/TROPIC/PHENYLEPH 1%-1%-2.5%
1 DROPS (EA) OPHTHALMIC (EYE)
Status: COMPLETED | OUTPATIENT
Start: 2025-03-27 | End: 2025-03-27

## 2025-03-27 RX ORDER — LIDOCAINE 40 MG/G
CREAM TOPICAL
Status: DISCONTINUED | OUTPATIENT
Start: 2025-03-27 | End: 2025-03-27 | Stop reason: HOSPADM

## 2025-03-27 RX ORDER — MOXIFLOXACIN IN NACL,ISO-OS/PF 1.6 MG/ML
SYRINGE (ML) INTRAOCULAR PRN
Status: DISCONTINUED | OUTPATIENT
Start: 2025-03-27 | End: 2025-03-27 | Stop reason: HOSPADM

## 2025-03-27 RX ORDER — SODIUM CHLORIDE, SODIUM LACTATE, POTASSIUM CHLORIDE, CALCIUM CHLORIDE 600; 310; 30; 20 MG/100ML; MG/100ML; MG/100ML; MG/100ML
INJECTION, SOLUTION INTRAVENOUS CONTINUOUS
Status: DISCONTINUED | OUTPATIENT
Start: 2025-03-27 | End: 2025-03-27 | Stop reason: HOSPADM

## 2025-03-27 RX ORDER — NALOXONE HYDROCHLORIDE 0.4 MG/ML
0.1 INJECTION, SOLUTION INTRAMUSCULAR; INTRAVENOUS; SUBCUTANEOUS
Status: DISCONTINUED | OUTPATIENT
Start: 2025-03-27 | End: 2025-03-27 | Stop reason: HOSPADM

## 2025-03-27 RX ORDER — ONDANSETRON 2 MG/ML
4 INJECTION INTRAMUSCULAR; INTRAVENOUS EVERY 30 MIN PRN
Status: DISCONTINUED | OUTPATIENT
Start: 2025-03-27 | End: 2025-03-27 | Stop reason: HOSPADM

## 2025-03-27 RX ORDER — ONDANSETRON 4 MG/1
4 TABLET, ORALLY DISINTEGRATING ORAL EVERY 30 MIN PRN
Status: DISCONTINUED | OUTPATIENT
Start: 2025-03-27 | End: 2025-03-27 | Stop reason: HOSPADM

## 2025-03-27 RX ORDER — LIDOCAINE HYDROCHLORIDE 10 MG/ML
INJECTION, SOLUTION EPIDURAL; INFILTRATION; INTRACAUDAL; PERINEURAL PRN
Status: DISCONTINUED | OUTPATIENT
Start: 2025-03-27 | End: 2025-03-27 | Stop reason: HOSPADM

## 2025-03-27 RX ORDER — PROPARACAINE HYDROCHLORIDE 5 MG/ML
1 SOLUTION/ DROPS OPHTHALMIC
Status: DISCONTINUED | OUTPATIENT
Start: 2025-03-27 | End: 2025-03-27 | Stop reason: HOSPADM

## 2025-03-27 RX ORDER — PHENYLEPHRINE HYDROCHLORIDE 100 MG/ML
1 SOLUTION/ DROPS OPHTHALMIC
Status: COMPLETED | OUTPATIENT
Start: 2025-03-27 | End: 2025-03-27

## 2025-03-27 RX ORDER — ACETAMINOPHEN 325 MG/1
650 TABLET ORAL
Status: DISCONTINUED | OUTPATIENT
Start: 2025-03-27 | End: 2025-03-27 | Stop reason: HOSPADM

## 2025-03-27 RX ORDER — PREDNISOLONE ACETATE 10 MG/ML
SUSPENSION/ DROPS OPHTHALMIC PRN
Status: DISCONTINUED | OUTPATIENT
Start: 2025-03-27 | End: 2025-03-27 | Stop reason: HOSPADM

## 2025-03-27 RX ORDER — DEXAMETHASONE SODIUM PHOSPHATE 10 MG/ML
4 INJECTION, SOLUTION INTRAMUSCULAR; INTRAVENOUS
Status: DISCONTINUED | OUTPATIENT
Start: 2025-03-27 | End: 2025-03-27 | Stop reason: HOSPADM

## 2025-03-27 RX ORDER — LEVOBUNOLOL HYDROCHLORIDE 5 MG/ML
SOLUTION/ DROPS OPHTHALMIC PRN
Status: DISCONTINUED | OUTPATIENT
Start: 2025-03-27 | End: 2025-03-27 | Stop reason: HOSPADM

## 2025-03-27 RX ADMIN — Medication 1 DROP: at 07:47

## 2025-03-27 RX ADMIN — Medication 1 DROP: at 07:37

## 2025-03-27 RX ADMIN — MIDAZOLAM 2 MG: 1 INJECTION INTRAMUSCULAR; INTRAVENOUS at 08:27

## 2025-03-27 RX ADMIN — PHENYLEPHRINE HYDROCHLORIDE 1 DROP: 100 SOLUTION/ DROPS OPHTHALMIC at 07:53

## 2025-03-27 RX ADMIN — PROPARACAINE HYDROCHLORIDE 1 DROP: 5 SOLUTION/ DROPS OPHTHALMIC at 07:36

## 2025-03-27 ASSESSMENT — ACTIVITIES OF DAILY LIVING (ADL)
ADLS_ACUITY_SCORE: 15

## 2025-03-27 NOTE — ANESTHESIA CARE TRANSFER NOTE
Patient: Bradley Garcia    Procedure: Procedure(s):  Phacoemulsification cataract extraction posterior chamber lens right eye       Diagnosis: Combined form of age-related cataract, right eye [H25.811]  Diagnosis Additional Information: No value filed.    Anesthesia Type:   MAC     Note:    Oropharynx: oropharynx clear of all foreign objects  Level of Consciousness: awake  Oxygen Supplementation: room air    Independent Airway: airway patency satisfactory and stable  Dentition: dentition unchanged  Vital Signs Stable: post-procedure vital signs reviewed and stable  Report to RN Given: handoff report given  Patient transferred to: Phase II    Handoff Report: Identifed the Patient, Identified the Reponsible Provider, Reviewed the pertinent medical history, Discussed the surgical course, Reviewed Intra-OP anesthesia mangement and issues during anesthesia, Set expectations for post-procedure period and Allowed opportunity for questions and acknowledgement of understanding  Vitals:  Vitals Value Taken Time   BP     Temp     Pulse     Resp     SpO2         Electronically Signed By: BERLIN Pearson CRNA  March 27, 2025  9:21 AM

## 2025-03-27 NOTE — CARE PLAN
Patient and responsible adult given discharge instructions with no questions regarding instructions. Bry score 20. Pain level 0/10.  Discharged from unit via ambulation. Patient discharged to home with sister.

## 2025-03-27 NOTE — OP NOTE
SURGEON: Fortino Reveles M.D.    PREOPERATIVE DIAGNOSIS:    Combined cataract, right eye ([id number])  POSTOPERATIVE DIAGNOSIS:    Combined cataract, right eye ([id number])    PROCEDURES PERFORMED - right eye    Cataract extraction with IOL - Standard Phacoemulsification ()  Omidria ()    SUPPLEMENTAL TECHNIQUES:    Intracameral moxifloxacin  Viscoat  ANESTHESIA: Topical/MAC with IV Sedation    COMPLICATIONS: None        Blood Loss: Minimal    INDICATIONS FOR SURGERY: Prior to the day of surgery, the patient was evaluated in the eye clinic and found to have a visually significant cataract. After the patient was informed of the risks and benefits of surgery, written informed consent was then obtained.            A lid speculum was used to part the lids and the operating microscope was lowered into position over the left eye.  A paracentesis was created at 140 degrees using a 1mm (side port) blade.  A 4.0 cc vial of Omidria (phenylephrine and ketorolac) was injected into the 500cc bottle of BSS that is to be used for intraocular irrigation throughout the case. Viscoat was inserted into the eye to reform and deepen the anterior chamber.  A (2.4 mm) keratome was used to create a beveled clear corneal incision at 200 degrees. A cystotome was used to croft the capsule and a continuous curvilinear capsulorrhexis was created. Hydrodissection was done using BSS on a Kay cannula and the lens was noted to rotate freely.      The phacoemulsification handpiece was primed and placed into the anterior chamber. The lens nucleus was grooved, the groove was deepened, and the lens was cracked with the assistance of a second instrument. The nucleus and epinuclear material was then removed in quadrants using a *   divide and conquer technique. The irrigation/aspiration handpiece was used to remove residual cortex. Next, the capsular bag was inflated with cohesive viscoelastic and enVista EE lens of +19.00 power with serial number: [id number]  was injected so that it unfolded within the bag. The wounds were hydrated and found to be water tight.        At the conclusion of the case, the anterior chamber remained deep and the lens was well centered. Preservative free intracameral moxifloxacin 0.16% in a volume of 0.3-0.4cc was injected into the anterior chamber.         The patient tolerated the procedure well and there were no complications. The patient was returned to the recovery room in good condition.    Surgeon Comment: 3/27/25 s/p CE/IOL OD. Thick lens. Thick posterior plaque. Took time to disassemble. No complications.

## 2025-03-27 NOTE — ANESTHESIA POSTPROCEDURE EVALUATION
Patient: Bradley Garcia    Procedure: Procedure(s):  Phacoemulsification cataract extraction posterior chamber lens right eye       Anesthesia Type:  MAC    Note:  Disposition: Outpatient   Postop Pain Control: Uneventful            Sign Out: Well controlled pain   PONV: No   Neuro/Psych: Uneventful            Sign Out: Acceptable/Baseline neuro status   Airway/Respiratory: Uneventful            Sign Out: Acceptable/Baseline resp. status   CV/Hemodynamics: Uneventful            Sign Out: Acceptable CV status; No obvious hypovolemia; No obvious fluid overload   Other NRE: NONE   DID A NON-ROUTINE EVENT OCCUR? No       Last vitals:  Vitals Value Taken Time   /77 03/27/25 0925   Temp 96.8  F (36  C) 03/27/25 0920   Pulse 70 03/27/25 0925   Resp 20 03/27/25 0925   SpO2 98 % 03/27/25 0928   Vitals shown include unfiled device data.    Electronically Signed By: BERLIN Sainz CRNA  March 27, 2025  9:29 AM

## 2025-05-19 ENCOUNTER — TELEPHONE (OUTPATIENT)
Dept: FAMILY MEDICINE | Facility: OTHER | Age: 60
End: 2025-05-19

## 2025-05-19 NOTE — TELEPHONE ENCOUNTER
His appointment on Thursday is with his .     Explained that an appointment will be needed to get the letter for a pill organizer. He understands and would like to get in for the next available appointment with Cayla Fleming     Please, call patient to schedule. He would also like to be called if there are any cancellations.

## 2025-05-19 NOTE — TELEPHONE ENCOUNTER
Patient came into the clinic to see if he can get a letter to send to his  to get help in loading the pill organizer. He is unable to do it and needs help. He needs a doctors note for this to happen. His appointment is Thursday and would like a letter hopefully before. Pt will come in and  the letter at registration. When ready please call [phone]  Or cell phone. [phone]

## 2025-06-25 ENCOUNTER — TELEPHONE (OUTPATIENT)
Dept: FAMILY MEDICINE | Facility: OTHER | Age: 60
End: 2025-06-25

## 2025-06-25 DIAGNOSIS — I10 ESSENTIAL HYPERTENSION: ICD-10-CM

## 2025-06-25 DIAGNOSIS — E11.65 TYPE 2 DIABETES MELLITUS WITH HYPERGLYCEMIA, WITHOUT LONG-TERM CURRENT USE OF INSULIN (H): Primary | ICD-10-CM

## 2025-06-25 DIAGNOSIS — E78.2 MIXED HYPERLIPIDEMIA: ICD-10-CM

## 2025-07-09 RX ORDER — NALTREXONE HYDROCHLORIDE 50 MG/1
TABLET, FILM COATED ORAL
COMMUNITY
Start: 2025-06-17

## 2025-07-10 ENCOUNTER — HOSPITAL ENCOUNTER (EMERGENCY)
Facility: HOSPITAL | Age: 60
Discharge: HOME | End: 2025-07-10
Attending: NURSE PRACTITIONER | Admitting: NURSE PRACTITIONER
Payer: MEDICARE

## 2025-07-10 ENCOUNTER — APPOINTMENT (OUTPATIENT)
Dept: GENERAL RADIOLOGY | Facility: HOSPITAL | Age: 60
End: 2025-07-10
Attending: NURSE PRACTITIONER
Payer: MEDICARE

## 2025-07-10 VITALS
TEMPERATURE: 97.4 F | HEART RATE: 55 BPM | OXYGEN SATURATION: 97 % | DIASTOLIC BLOOD PRESSURE: 90 MMHG | SYSTOLIC BLOOD PRESSURE: 155 MMHG | RESPIRATION RATE: 16 BRPM

## 2025-07-10 DIAGNOSIS — M25.551 RIGHT HIP PAIN: Primary | ICD-10-CM

## 2025-07-10 PROCEDURE — G0463 HOSPITAL OUTPT CLINIC VISIT: HCPCS | Performed by: NURSE PRACTITIONER

## 2025-07-10 PROCEDURE — 99213 OFFICE O/P EST LOW 20 MIN: CPT | Performed by: NURSE PRACTITIONER

## 2025-07-10 PROCEDURE — 73502 X-RAY EXAM HIP UNI 2-3 VIEWS: CPT

## 2025-07-10 PROCEDURE — 73502 X-RAY EXAM HIP UNI 2-3 VIEWS: CPT | Mod: 26 | Performed by: RADIOLOGY

## 2025-07-10 RX ORDER — PREDNISONE 20 MG/1
TABLET ORAL
Qty: 10 TABLET | Refills: 0 | Status: SHIPPED | OUTPATIENT
Start: 2025-07-10

## 2025-07-10 ASSESSMENT — ENCOUNTER SYMPTOMS
DIARRHEA: 0
NAUSEA: 0
VOMITING: 0
FEVER: 0
CHILLS: 0
SHORTNESS OF BREATH: 0
PSYCHIATRIC NEGATIVE: 1

## 2025-07-10 ASSESSMENT — ACTIVITIES OF DAILY LIVING (ADL)
ADLS_ACUITY_SCORE: 41
ADLS_ACUITY_SCORE: 41

## 2025-07-10 NOTE — ED TRIAGE NOTES
C/o left hip pain x 3 days, denies any injury,  took tylenol or ibuprofen yesterday with minimal relief.  Keeps him awake at night in pain.  Ambulated to room without difficulty

## 2025-07-10 NOTE — ED PROVIDER NOTES
History     Chief Complaint   Patient presents with    Hip Pain     Left hip for 3 days, denies injury rates 5/10     HPI  Bradley Garcia is a 59 year old male who presents to urgent care today ambulatory with complaints of right hip pain that is been ongoing for 3 days.  Patient states he has had hip pain off and on for years, feels like a typical flareup.  Denies any fall, injury or trauma.  Denies any bowel or bladder dysfunction.  Denies any saddle anesthesia.  Denies any numbness or weakness of bilateral lower extremities.  Took ASA and APAP for pain.  Has seen a chiropractor in the past for hip pain, no recent visits.  No other concerns.    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 [facility] record.   IMO Update 10/11          Past Medical History:    Past Medical History:   Diagnosis Date    Basic learning disability, reading 10/10/2018    Colonoscopy refused 10/10/2018    Essential (primary) hypertension 10/10/2018    Gastroesophageal reflux disease without esophagitis     Generalized anxiety disorder     History of alcohol abuse 10/10/2018    History of complete eye exam 01/25/2025    Hypocalcemia 02/23/2019    Iron deficiency anemia due to dietary causes     Microscopic hematuria     Mild intellectual disabilities 10/10/2018    Mixed hyperlipidemia 10/10/2018    Onychomycosis due to dermatophyte 10/10/2018    Other specified hypothyroidism 02/23/2019    Renal insufficiency 01/25/2025    Type 2 diabetes  mellitus with hyperglycemia, without long-term current use of insulin (H) 02/23/2019       Past Surgical History:    Past Surgical History:   Procedure Laterality Date    MOUTH SURGERY      oral surgery    PHACOEMULSIFICATION WITH STANDARD INTRAOCULAR LENS IMPLANT Right 3/27/2025    Procedure: Phacoemulsification cataract extraction posterior chamber lens right eye;  Surgeon: Fortino Reveles MD;  Location: [facility]       Family History:    Family History   Problem Relation Age of Onset    No Known Problems Mother     Alcoholism Father     Cancer Father     Diabetes Brother     Diabetes Cousin     Glaucoma No family hx of     Macular Degeneration No family hx of        Social History:  Marital Status:  Single [1]  Social History     Tobacco Use    Smoking status: Never    Smokeless tobacco: Never   Vaping Use    Vaping status: Never Used   Substance Use Topics    Alcohol use: Yes     Comment: beer and whisky -- pt states every couple weeks ocassional    Drug use: No        Medications:    ASPIRIN PO  glipiZIDE (GLUCOTROL) 10 MG tablet  levothyroxine (SYNTHROID/LEVOTHROID) 88 MCG tablet  lisinopril (ZESTRIL) 40 MG tablet  METFORMIN HCL PO  baclofen (LIORESAL) 10 MG tablet  Blood Glucose Monitoring Suppl (BLOOD GLUCOSE TEST STRIPS STRP)  calcium carbonate-vitamin D (CALTRATE) 600-10 MG-MCG per tablet  lancets misc. KIT  naltrexone (DEPADE/REVIA) 50 MG tablet  pioglitazone (ACTOS) 45 MG tablet  prazosin (MINIPRESS) 1 MG capsule  predniSONE (DELTASONE) 20 MG tablet  simvastatin (ZOCOR) 20 MG tablet      Review of Systems   Constitutional:  Negative for chills and fever.   Respiratory:  Negative for shortness of breath.    Cardiovascular:  Negative for chest pain.   Gastrointestinal:  Negative for diarrhea, nausea and vomiting.   Musculoskeletal:  Negative for gait problem.        Right hip pain   Skin: Negative.    Psychiatric/Behavioral: Negative.       Physical Exam   BP: (!) 155/90  Pulse: 55  Temp: 97.4  F (36.3   C)  Resp: 16  SpO2: 97 %    Physical Exam  Vitals and nursing note reviewed.   Constitutional:       General: He is not in acute distress.     Appearance: Normal appearance. He is not ill-appearing or toxic-appearing.   Cardiovascular:      Rate and Rhythm: Normal rate and regular rhythm.      Pulses: Normal pulses.      Heart sounds: Normal heart sounds.   Pulmonary:      Effort: Pulmonary effort is normal.      Breath sounds: Normal breath sounds.   Musculoskeletal:      Right hip: No deformity or lacerations. Decreased range of motion. Normal strength.      Right knee: Normal.      Comments: Right SI joint tenderness   Skin:     General: Skin is warm and dry.      Capillary Refill: Capillary refill takes less than 2 seconds.   Neurological:      Mental Status: He is alert.   Psychiatric:         Mood and Affect: Mood normal.       ED Course     Procedures    Recent Results (from the past 24 hours)   XR Pelvis w Hip Right G/E 2 Views    Narrative    PROCEDURE:  XR PELVIS AND HIP RIGHT 2 VIEWS    HISTORY: right hip pain, no injury    COMPARISON:  2/23/2012    TECHNIQUE:  1 view of the pelvis and 2 views of the right hip were  obtained.    FINDINGS:  There are mild degenerative changes of the right hip. No  acute fracture or dislocation. There are also degenerative changes of  the sacroiliac joints and left hip.       Impression    IMPRESSION: No acute fracture.      SANDRITA FINE MD         SYSTEM ID:  [id number]       Medications - No data to display    Assessments & Plan (with Medical Decision Making)     I have reviewed the nursing notes.    I have reviewed the findings, diagnosis, plan and need for follow up with the patient.  (M25.551) Right hip pain  (primary encounter diagnosis)  Plan:   Patient ambulatory with a nontoxic appearance.  Patient able to stand from a seated position in order to ambulate.  Patient has right SI joint tenderness on palpation.  No deformity, laceration or wound.  X-ray shows no  acute fracture, mild degenerative changes of the right hip.  Diabetic, last A1c 6.9.  Given pain, reviewed risk versus benefits of prednisone, will start patient on prednisone at this time.  Close monitoring of blood sugars.  Do not take NSAIDs with prednisone.  Follow-up with primary care provider or return to urgent care/ED with any worsening in condition or additional concerns.  Patient in agreement treatment plan.    New Prescriptions    PREDNISONE (DELTASONE) 20 MG TABLET    Take two tablets (= 40mg) each day for 5 (five) days     Final diagnoses:   Right hip pain     7/10/2025   [facility]       Pamela Jade NP  07/10/25 1326

## 2025-07-10 NOTE — ED TRIAGE NOTES
Pt given instruct via AVS, verbalizes understanding that prednisone sent to pharmacy.  Pt discharged ambulatory.

## 2025-07-10 NOTE — DISCHARGE INSTRUCTIONS
Prednisone as ordered (monitor blood sugars closely while on medication)    Follow up with primary care provider or return to urgent care/ED with any worsening in condition or additional concerns.

## 2025-07-14 ENCOUNTER — HOSPITAL ENCOUNTER (EMERGENCY)
Facility: HOSPITAL | Age: 60
Discharge: HOME | End: 2025-07-14
Attending: PHYSICIAN ASSISTANT
Payer: MEDICARE

## 2025-07-14 ENCOUNTER — APPOINTMENT (OUTPATIENT)
Dept: CT IMAGING | Facility: HOSPITAL | Age: 60
End: 2025-07-14
Attending: PHYSICIAN ASSISTANT
Payer: MEDICARE

## 2025-07-14 VITALS
DIASTOLIC BLOOD PRESSURE: 78 MMHG | RESPIRATION RATE: 20 BRPM | TEMPERATURE: 97 F | HEART RATE: 68 BPM | OXYGEN SATURATION: 100 % | SYSTOLIC BLOOD PRESSURE: 154 MMHG

## 2025-07-14 DIAGNOSIS — N20.0 CALCULUS OF KIDNEY: ICD-10-CM

## 2025-07-14 LAB
ALBUMIN SERPL BCG-MCNC: 4.1 G/DL (ref 3.5–5.2)
ALBUMIN UR-MCNC: NEGATIVE MG/DL
ALP SERPL-CCNC: 65 U/L (ref 40–150)
ALT SERPL W P-5'-P-CCNC: 18 U/L (ref 0–70)
ANION GAP SERPL CALCULATED.3IONS-SCNC: 13 MMOL/L (ref 7–15)
APPEARANCE UR: CLEAR
AST SERPL W P-5'-P-CCNC: 22 U/L (ref 0–45)
BASOPHILS # BLD AUTO: 0 10E3/UL (ref 0–0.2)
BASOPHILS NFR BLD AUTO: 0 %
BILIRUB SERPL-MCNC: 0.6 MG/DL
BILIRUB UR QL STRIP: NEGATIVE
BUN SERPL-MCNC: 33.7 MG/DL (ref 8–23)
CALCIUM SERPL-MCNC: 9.5 MG/DL (ref 8.8–10.4)
CHLORIDE SERPL-SCNC: 103 MMOL/L (ref 98–107)
COLOR UR AUTO: YELLOW
CREAT SERPL-MCNC: 1.04 MG/DL (ref 0.67–1.17)
CRP SERPL-MCNC: <3 MG/L
EGFRCR SERPLBLD CKD-EPI 2021: 82 ML/MIN/1.73M2
EOSINOPHIL # BLD AUTO: 0 10E3/UL (ref 0–0.7)
EOSINOPHIL NFR BLD AUTO: 0 %
ERYTHROCYTE [DISTWIDTH] IN BLOOD BY AUTOMATED COUNT: 13 % (ref 10–15)
GLUCOSE SERPL-MCNC: 96 MG/DL (ref 70–99)
GLUCOSE UR STRIP-MCNC: NEGATIVE MG/DL
HCO3 SERPL-SCNC: 22 MMOL/L (ref 22–29)
HCT VFR BLD AUTO: 39 % (ref 40–53)
HGB BLD-MCNC: 13.3 G/DL (ref 13.3–17.7)
HGB UR QL STRIP: NEGATIVE
HOLD SPECIMEN: NORMAL
HYALINE CASTS: 2 /LPF
IMM GRANULOCYTES # BLD: 0.1 10E3/UL
IMM GRANULOCYTES NFR BLD: 0 %
KETONES UR STRIP-MCNC: ABNORMAL MG/DL
LACTATE SERPL-SCNC: 1.2 MMOL/L (ref 0.7–2)
LEUKOCYTE ESTERASE UR QL STRIP: NEGATIVE
LYMPHOCYTES # BLD AUTO: 3.4 10E3/UL (ref 0.8–5.3)
LYMPHOCYTES NFR BLD AUTO: 25 %
MCH RBC QN AUTO: 32.5 PG (ref 26.5–33)
MCHC RBC AUTO-ENTMCNC: 34.1 G/DL (ref 31.5–36.5)
MCV RBC AUTO: 95 FL (ref 78–100)
MONOCYTES # BLD AUTO: 0.9 10E3/UL (ref 0–1.3)
MONOCYTES NFR BLD AUTO: 7 %
MUCOUS THREADS #/AREA URNS LPF: PRESENT /LPF
NEUTROPHILS # BLD AUTO: 9.3 10E3/UL (ref 1.6–8.3)
NEUTROPHILS NFR BLD AUTO: 68 %
NITRATE UR QL: NEGATIVE
NRBC # BLD AUTO: 0 10E3/UL
NRBC BLD AUTO-RTO: 0 /100
PH UR STRIP: 5.5 [PH] (ref 4.7–8)
PLATELET # BLD AUTO: 213 10E3/UL (ref 150–450)
POTASSIUM SERPL-SCNC: 4.3 MMOL/L (ref 3.4–5.3)
PROCALCITONIN SERPL IA-MCNC: <0.02 NG/ML
PROT SERPL-MCNC: 7.6 G/DL (ref 6.4–8.3)
RBC # BLD AUTO: 4.09 10E6/UL (ref 4.4–5.9)
RBC URINE: 0 /HPF
SODIUM SERPL-SCNC: 138 MMOL/L (ref 135–145)
SP GR UR STRIP: 1.03 (ref 1–1.03)
SQUAMOUS EPITHELIAL: 0 /HPF
UROBILINOGEN UR STRIP-MCNC: 2 MG/DL
WBC # BLD AUTO: 13.7 10E3/UL (ref 4–11)
WBC URINE: <1 /HPF

## 2025-07-14 PROCEDURE — 81003 URINALYSIS AUTO W/O SCOPE: CPT | Performed by: PHYSICIAN ASSISTANT

## 2025-07-14 PROCEDURE — 84145 PROCALCITONIN (PCT): CPT | Performed by: PHYSICIAN ASSISTANT

## 2025-07-14 PROCEDURE — 74176 CT ABD & PELVIS W/O CONTRAST: CPT

## 2025-07-14 PROCEDURE — 86140 C-REACTIVE PROTEIN: CPT | Performed by: PHYSICIAN ASSISTANT

## 2025-07-14 PROCEDURE — 74176 CT ABD & PELVIS W/O CONTRAST: CPT | Mod: 26 | Performed by: RADIOLOGY

## 2025-07-14 PROCEDURE — 82247 BILIRUBIN TOTAL: CPT | Performed by: PHYSICIAN ASSISTANT

## 2025-07-14 PROCEDURE — 83605 ASSAY OF LACTIC ACID: CPT | Performed by: PHYSICIAN ASSISTANT

## 2025-07-14 PROCEDURE — 36415 COLL VENOUS BLD VENIPUNCTURE: CPT | Performed by: PHYSICIAN ASSISTANT

## 2025-07-14 PROCEDURE — 99284 EMERGENCY DEPT VISIT MOD MDM: CPT | Mod: 25 | Performed by: PHYSICIAN ASSISTANT

## 2025-07-14 PROCEDURE — 99284 EMERGENCY DEPT VISIT MOD MDM: CPT | Performed by: PHYSICIAN ASSISTANT

## 2025-07-14 PROCEDURE — 250N000013 HC RX MED GY IP 250 OP 250 PS 637: Performed by: PHYSICIAN ASSISTANT

## 2025-07-14 PROCEDURE — 85018 HEMOGLOBIN: CPT | Performed by: PHYSICIAN ASSISTANT

## 2025-07-14 RX ORDER — HYDROCODONE BITARTRATE AND ACETAMINOPHEN 5; 325 MG/1; MG/1
1 TABLET ORAL ONCE
Refills: 0 | Status: COMPLETED | OUTPATIENT
Start: 2025-07-14 | End: 2025-07-14

## 2025-07-14 RX ORDER — TAMSULOSIN HYDROCHLORIDE 0.4 MG/1
0.4 CAPSULE ORAL DAILY
Qty: 14 CAPSULE | Refills: 0 | Status: SHIPPED | OUTPATIENT
Start: 2025-07-14 | End: 2025-07-16

## 2025-07-14 RX ORDER — HYDROCODONE BITARTRATE AND ACETAMINOPHEN 5; 325 MG/1; MG/1
1 TABLET ORAL EVERY 8 HOURS PRN
Qty: 10 TABLET | Refills: 0 | Status: SHIPPED | OUTPATIENT
Start: 2025-07-14 | End: 2025-07-17

## 2025-07-14 RX ADMIN — HYDROCODONE BITARTRATE AND ACETAMINOPHEN 1 TABLET: 5; 325 TABLET ORAL at 14:24

## 2025-07-14 ASSESSMENT — ACTIVITIES OF DAILY LIVING (ADL)
ADLS_ACUITY_SCORE: 42

## 2025-07-14 ASSESSMENT — COLUMBIA-SUICIDE SEVERITY RATING SCALE - C-SSRS
2. HAVE YOU ACTUALLY HAD ANY THOUGHTS OF KILLING YOURSELF IN THE PAST MONTH?: NO
1. IN THE PAST MONTH, HAVE YOU WISHED YOU WERE DEAD OR WISHED YOU COULD GO TO SLEEP AND NOT WAKE UP?: NO
6. HAVE YOU EVER DONE ANYTHING, STARTED TO DO ANYTHING, OR PREPARED TO DO ANYTHING TO END YOUR LIFE?: NO

## 2025-07-14 NOTE — ED PROVIDER NOTES
History     Chief Complaint   Patient presents with    Hip Pain     HPI  Bradley Garcia is a 59 year old male who presents to the emergency department with right sided hip/flank pain. Patient has been having right-sided flank pain for approximately 1 week.  Patient was seen on 7/10/2025 in the urgent care here and diagnosed with right hip pain.  Patient had x-ray performed of pelvis and right hip showing mild degenerative changes only.  Patient was discharged with prescription for prednisone and told to follow-up with primary care provider if no improvement.  Patient states that he has had no improvement.  Patient currently rates his pain at 5 out of 10.  He denies any fevers, chills, pain radiating anywhere else, dysuria, hematuria, urinary retention, incontinence of bowel or bladder, peripheral weakness or numbness.    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 [facility]- record.   IMO Update 10/11          Past Medical History:    Past Medical History:   Diagnosis Date    Basic learning disability, reading 10/10/2018    Colonoscopy refused 10/10/2018    Essential (primary) hypertension 10/10/2018    Gastroesophageal reflux disease without esophagitis     Generalized anxiety disorder     History of alcohol abuse 10/10/2018    History of complete eye exam 01/25/2025    Hypocalcemia 02/23/2019    Iron deficiency anemia due to dietary causes     Microscopic hematuria     Mild intellectual disabilities 10/10/2018     Mixed hyperlipidemia 10/10/2018    Onychomycosis due to dermatophyte 10/10/2018    Other specified hypothyroidism 02/23/2019    Renal insufficiency 01/25/2025    Type 2 diabetes mellitus with hyperglycemia, without long-term current use of insulin (H) 02/23/2019       Past Surgical History:    Past Surgical History:   Procedure Laterality Date    MOUTH SURGERY      oral surgery    PHACOEMULSIFICATION WITH STANDARD INTRAOCULAR LENS IMPLANT Right 3/27/2025    Procedure: Phacoemulsification cataract extraction posterior chamber lens right eye;  Surgeon: Fortino Reveles MD;  Location: [facility] OR       Family History:    Family History   Problem Relation Age of Onset    No Known Problems Mother     Alcoholism Father     Cancer Father     Diabetes Brother     Diabetes Cousin     Glaucoma No family hx of     Macular Degeneration No family hx of        Social History:  Marital Status:  Single [1]  Social History     Tobacco Use    Smoking status: Never    Smokeless tobacco: Never   Vaping Use    Vaping status: Never Used   Substance Use Topics    Alcohol use: Yes     Comment: beer and whisky -- pt states every couple weeks ocassional    Drug use: No        Medications:    HYDROcodone-acetaminophen (NORCO) 5-325 MG tablet  tamsulosin (FLOMAX) 0.4 MG capsule  ASPIRIN PO  baclofen (LIORESAL) 10 MG tablet  Blood Glucose Monitoring Suppl (BLOOD GLUCOSE TEST STRIPS STRP)  calcium carbonate-vitamin D (CALTRATE) 600-10 MG-MCG per tablet  glipiZIDE (GLUCOTROL) 10 MG tablet  lancets misc. KIT  levothyroxine (SYNTHROID/LEVOTHROID) 88 MCG tablet  lisinopril (ZESTRIL) 40 MG tablet  METFORMIN HCL PO  naltrexone (DEPADE/REVIA) 50 MG tablet  pioglitazone (ACTOS) 45 MG tablet  prazosin (MINIPRESS) 1 MG capsule  predniSONE (DELTASONE) 20 MG tablet  simvastatin (ZOCOR) 20 MG tablet          Review of Systems   Genitourinary:         Right flank pain   All other systems reviewed and are negative.      Physical Exam   BP: (!) 146/84  Pulse:  74  Temp: 97  F (36.1  C)  Resp: 20  SpO2: 96 %      Physical Exam  Vitals and nursing note reviewed.   Constitutional:       General: He is not in acute distress.     Appearance: Normal appearance. He is not ill-appearing or toxic-appearing.   HENT:      Head: Normocephalic.   Cardiovascular:      Rate and Rhythm: Regular rhythm.      Heart sounds: Normal heart sounds.   Pulmonary:      Effort: Pulmonary effort is normal.      Breath sounds: Normal breath sounds.   Abdominal:      General: Bowel sounds are normal.      Tenderness: There is no abdominal tenderness. There is right CVA tenderness. There is no left CVA tenderness.   Musculoskeletal:        Back:       Comments: Positive right-sided CVA tenderness.  No visible erythema, bruising, or wound to right flank.  Patient denies any tenderness with palpation of gluteal muscle or over greater trochanteric bursa.  Patient denies any pain with range of motion at right hip area.  Negative pain associated with straight leg lift.  Normal strength of lower right extremity, CMS intact.   Neurological:      Mental Status: He is alert and oriented to person, place, and time.         ED Course     ED Course as of 07/14/25 1429   Mon Jul 14, 2025   1217 Into see patient   1217 HPI physical exam performed.  Patient been having right-sided flank pain for approximately 1 week.  Patient was seen on 7/10/2025 in the urgent care here and diagnosed with right hip pain.  Patient had x-ray performed of pelvis and right hip showing mild degenerative changes only.  Patient was discharged with prescription for prednisone and told to follow-up with primary care provider if no improvement.  Patient states that he has had no improvement.  Patient currently rates his pain at 5 out of 10.  He denies any fevers, chills, pain radiating anywhere else, dysuria, hematuria, urinary retention, incontinence of bowel or bladder, peripheral weakness or numbness.   1232 Labs and CT abdomen pelvis  without contrast is ordered.   1329 WBC(!): 13.7     Procedures             Critical Care time:               Recent Results (from the past 24 hours)   UA with Microscopic reflex to Culture    Specimen: Urine, Midstream   Result Value Ref Range    Color Urine Yellow Colorless, Straw, Light Yellow, Yellow    Appearance Urine Clear Clear    Glucose Urine Negative Negative mg/dL    Bilirubin Urine Negative Negative    Ketones Urine Trace (A) Negative mg/dL    Specific Gravity Urine 1.033 1.003 - 1.035    Blood Urine Negative Negative    pH Urine 5.5 4.7 - 8.0    Protein Albumin Urine Negative Negative mg/dL    Urobilinogen Urine 2.0 (A) Normal mg/dL    Nitrite Urine Negative Negative    Leukocyte Esterase Urine Negative Negative    Mucus Urine Present (A) None Seen /LPF    RBC Urine 0 <=2 /HPF    WBC Urine <1 <=5 /HPF    Squamous Epithelials Urine 0 <=1 /HPF    Hyaline Casts Urine 2 <=2 /LPF    Narrative    Urine Culture not indicated   CBC with platelets differential    Narrative    The following orders were created for panel order CBC with platelets differential.  Procedure                               Abnormality         Status                     ---------                               -----------         ------                     CBC with platelets and ...[[id number]]  Abnormal            Final result                 Please view results for these tests on the individual orders.   Comprehensive metabolic panel   Result Value Ref Range    Sodium 138 135 - 145 mmol/L    Potassium 4.3 3.4 - 5.3 mmol/L    Carbon Dioxide (CO2) 22 22 - 29 mmol/L    Anion Gap 13 7 - 15 mmol/L    Urea Nitrogen 33.7 (H) 8.0 - 23.0 mg/dL    Creatinine 1.04 0.67 - 1.17 mg/dL    GFR Estimate 82 >60 mL/min/1.73m2    Calcium 9.5 8.8 - 10.4 mg/dL    Chloride 103 98 - 107 mmol/L    Glucose 96 70 - 99 mg/dL    Alkaline Phosphatase 65 40 - 150 U/L    AST 22 0 - 45 U/L    ALT 18 0 - 70 U/L    Protein Total 7.6 6.4 - 8.3 g/dL    Albumin 4.1 3.5 -  5.2 g/dL    Bilirubin Total 0.6 <=1.2 mg/dL   CRP inflammation   Result Value Ref Range    CRP Inflammation <3.00 <5.00 mg/L   CBC with platelets and differential   Result Value Ref Range    WBC Count 13.7 (H) 4.0 - 11.0 10e3/uL    RBC Count 4.09 (L) 4.40 - 5.90 10e6/uL    Hemoglobin 13.3 13.3 - 17.7 g/dL    Hematocrit 39.0 (L) 40.0 - 53.0 %    MCV 95 78 - 100 fL    MCH 32.5 26.5 - 33.0 pg    MCHC 34.1 31.5 - 36.5 g/dL    RDW 13.0 10.0 - 15.0 %    Platelet Count 213 150 - 450 10e3/uL    % Neutrophils 68 %    % Lymphocytes 25 %    % Monocytes 7 %    % Eosinophils 0 %    % Basophils 0 %    % Immature Granulocytes 0 %    NRBCs per 100 WBC 0 <1 /100    Absolute Neutrophils 9.3 (H) 1.6 - 8.3 10e3/uL    Absolute Lymphocytes 3.4 0.8 - 5.3 10e3/uL    Absolute Monocytes 0.9 0.0 - 1.3 10e3/uL    Absolute Eosinophils 0.0 0.0 - 0.7 10e3/uL    Absolute Basophils 0.0 0.0 - 0.2 10e3/uL    Absolute Immature Granulocytes 0.1 <=0.4 10e3/uL    Absolute NRBCs 0.0 10e3/uL   Extra Tube    Narrative    The following orders were created for panel order Extra Tube.  Procedure                               Abnormality         Status                     ---------                               -----------         ------                     Extra Blue Top Tube[[id number]]                             In process                 Extra Red Top Tube[[id number]]                              In process                 Extra Heparinized Syringe[[id number]]                       In process                   Please view results for these tests on the individual orders.   Procalcitonin   Result Value Ref Range    Procalcitonin <0.02 <0.50 ng/mL   Lactic acid whole blood with 1x repeat in 2 hr when >2   Result Value Ref Range    Lactic Acid, Initial 1.2 0.7 - 2.0 mmol/L   CT Abdomen Pelvis w/o Contrast    Narrative    PROCEDURE:  CT ABDOMEN PELVIS W/O CONTRAST    HISTORY:  Right-sided flank pain x 1 week.  Stone rule out    TECHNIQUE:  Helical CT of the  abdomen and pelvis was performed without  intravenous contrast. This CT exam was performed using one or more the  following dose reduction techniques: automated exposure control,  adjustment of the mA and/or kV according to patient size, and/or  iterative reconstruction technique.    COMPARISON:  7/10/2025    FINDINGS:      Evaluation of the solid organs is somewhat limited due to the lack of  intravenous contrast.    Limited views through the lung bases show no focal consolidation or  intrapulmonary mass.     The liver is normal in size and attenuation. The gallbladder is  unremarkable. The spleen, pancreas and adrenal glands are  unremarkable. A 3 mm nonobstructive calculus versus vascular  calcification is seen in the right kidney. No ureteral stone or  hydronephrosis is present. There is no evidence of hydronephrosis.  There is no abdominal aortic aneurysm.      The bowel is normal in caliber. .    No free fluid, free air or adenopathy is seen. Degenerative changes  are seen in the lower lumbar spine and SI joints. Transitional anatomy  of L5 is noted. No suspicious osseous lesions are identified.      Impression    IMPRESSION:      No ureteral stone or hydronephrosis. 3 mm nonobstructive probable  right renal calculus.    JOVANY ALDRIDGE MD         SYSTEM ID:  [id number]       Medications   HYDROcodone-acetaminophen (NORCO) 5-325 MG per tablet 1 tablet (1 tablet Oral $Given 7/14/25 0853)       Assessments & Plan (with Medical Decision Making)   #1.  Renal calculi, right    Discussed exam findings as well as lab results and CT scan results with patient.  CT scan shows 3 mm nonobstructing right renal calculus.  No hydronephrosis.  Patient does have elevated WBC of 13.7; patient is afebrile, negative lactic acid.  Patient's urinalysis does not show any markers for infection or blood.  Patient to be discharged with urine strainer.  Strict return precautions discussed with patient.  Any worsening or concerning  symptoms patient can return to emergency department.  Patient to be discharged with Flomax and short prescription of Norco for any severe breakthrough pain.  Patient is encouraged to push fluids.  I like patient to follow-up with PCP in the next 5 to 7 days.  Any additional concerns patient can return to emergency department.  Patient verbalized understanding and agreement of plan.    I have reviewed the nursing notes.    I have reviewed the findings, diagnosis, plan and need for follow up with the patient.            New Prescriptions    HYDROCODONE-ACETAMINOPHEN (NORCO) 5-325 MG TABLET    Take 1 tablet by mouth every 8 hours as needed for severe pain.    TAMSULOSIN (FLOMAX) 0.4 MG CAPSULE    Take 1 capsule (0.4 mg) by mouth daily for 14 days.       Final diagnoses:   Calculus of kidney       7/14/2025   [facility]       Forest Garcia PA-C  07/14/25 [id number]

## 2025-07-14 NOTE — DISCHARGE INSTRUCTIONS
You have a kidney stone in your right kidney.   and take Flomax and Norco as prescribed  Push fluids  Use the urine strainer  Follow-up with primary care provider in the next 5 to 7 days  If you experience fever or uncontrolled pain, or have any new concerning symptoms return to emergency department.

## 2025-07-14 NOTE — ED TRIAGE NOTES
"Patient presents with complaints of right hip pain that he states he was seen for last week and that the pain has not gotten better. \"The pills I was given aren't working\" Did take tylenol this am \"1500mg\"     Triage Assessment (Adult)       Row Name 07/14/25 1157          Triage Assessment    Airway WDL WDL        Skin Circulation/Temperature WDL    Skin Circulation/Temperature WDL WDL        Cognitive/Neuro/Behavioral WDL    Cognitive/Neuro/Behavioral WDL WDL                     "

## 2025-07-16 ENCOUNTER — HOSPITAL ENCOUNTER (EMERGENCY)
Facility: HOSPITAL | Age: 60
Discharge: HOME | End: 2025-07-16
Attending: PHYSICIAN ASSISTANT
Payer: MEDICARE

## 2025-07-16 ENCOUNTER — APPOINTMENT (OUTPATIENT)
Dept: CT IMAGING | Facility: HOSPITAL | Age: 60
End: 2025-07-16
Attending: PHYSICIAN ASSISTANT
Payer: MEDICARE

## 2025-07-16 VITALS
OXYGEN SATURATION: 97 % | DIASTOLIC BLOOD PRESSURE: 96 MMHG | SYSTOLIC BLOOD PRESSURE: 152 MMHG | RESPIRATION RATE: 18 BRPM | TEMPERATURE: 97.8 F | HEART RATE: 63 BPM | BODY MASS INDEX: 44.39 KG/M2 | WEIGHT: 315 LBS

## 2025-07-16 DIAGNOSIS — R10.9 ACUTE RIGHT FLANK PAIN: ICD-10-CM

## 2025-07-16 LAB
ALBUMIN SERPL BCG-MCNC: 4.1 G/DL (ref 3.5–5.2)
ALBUMIN UR-MCNC: NEGATIVE MG/DL
ALP SERPL-CCNC: 72 U/L (ref 40–150)
ALT SERPL W P-5'-P-CCNC: 19 U/L (ref 0–70)
ANION GAP SERPL CALCULATED.3IONS-SCNC: 11 MMOL/L (ref 7–15)
APPEARANCE UR: CLEAR
AST SERPL W P-5'-P-CCNC: 19 U/L (ref 0–45)
BASOPHILS # BLD AUTO: 0 10E3/UL (ref 0–0.2)
BASOPHILS NFR BLD AUTO: 0 %
BILIRUB SERPL-MCNC: 0.6 MG/DL
BILIRUB UR QL STRIP: NEGATIVE
BUN SERPL-MCNC: 16 MG/DL (ref 8–23)
CALCIUM SERPL-MCNC: 9.9 MG/DL (ref 8.8–10.4)
CHLORIDE SERPL-SCNC: 103 MMOL/L (ref 98–107)
COLOR UR AUTO: ABNORMAL
CREAT SERPL-MCNC: 0.88 MG/DL (ref 0.67–1.17)
EGFRCR SERPLBLD CKD-EPI 2021: >90 ML/MIN/1.73M2
EOSINOPHIL # BLD AUTO: 0.2 10E3/UL (ref 0–0.7)
EOSINOPHIL NFR BLD AUTO: 2 %
ERYTHROCYTE [DISTWIDTH] IN BLOOD BY AUTOMATED COUNT: 12.9 % (ref 10–15)
GLUCOSE SERPL-MCNC: 142 MG/DL (ref 70–99)
GLUCOSE UR STRIP-MCNC: NEGATIVE MG/DL
HCO3 SERPL-SCNC: 24 MMOL/L (ref 22–29)
HCT VFR BLD AUTO: 41.8 % (ref 40–53)
HGB BLD-MCNC: 14.2 G/DL (ref 13.3–17.7)
HGB UR QL STRIP: NEGATIVE
HOLD SPECIMEN: NORMAL
HOLD SPECIMEN: NORMAL
IMM GRANULOCYTES # BLD: 0 10E3/UL
IMM GRANULOCYTES NFR BLD: 0 %
KETONES UR STRIP-MCNC: NEGATIVE MG/DL
LEUKOCYTE ESTERASE UR QL STRIP: NEGATIVE
LIPASE SERPL-CCNC: 40 U/L (ref 13–60)
LYMPHOCYTES # BLD AUTO: 3 10E3/UL (ref 0.8–5.3)
LYMPHOCYTES NFR BLD AUTO: 33 %
MCH RBC QN AUTO: 32 PG (ref 26.5–33)
MCHC RBC AUTO-ENTMCNC: 34 G/DL (ref 31.5–36.5)
MCV RBC AUTO: 94 FL (ref 78–100)
MONOCYTES # BLD AUTO: 0.7 10E3/UL (ref 0–1.3)
MONOCYTES NFR BLD AUTO: 7 %
MUCOUS THREADS #/AREA URNS LPF: PRESENT /LPF
NEUTROPHILS # BLD AUTO: 5.2 10E3/UL (ref 1.6–8.3)
NEUTROPHILS NFR BLD AUTO: 57 %
NITRATE UR QL: NEGATIVE
NRBC # BLD AUTO: 0 10E3/UL
NRBC BLD AUTO-RTO: 0 /100
PH UR STRIP: 5 [PH] (ref 4.7–8)
PLATELET # BLD AUTO: 215 10E3/UL (ref 150–450)
POTASSIUM SERPL-SCNC: 4.4 MMOL/L (ref 3.4–5.3)
PROT SERPL-MCNC: 7.4 G/DL (ref 6.4–8.3)
RBC # BLD AUTO: 4.44 10E6/UL (ref 4.4–5.9)
RBC URINE: 0 /HPF
SODIUM SERPL-SCNC: 138 MMOL/L (ref 135–145)
SP GR UR STRIP: 1.02 (ref 1–1.03)
SQUAMOUS EPITHELIAL: 0 /HPF
UROBILINOGEN UR STRIP-MCNC: NORMAL MG/DL
WBC # BLD AUTO: 9.1 10E3/UL (ref 4–11)
WBC URINE: <1 /HPF

## 2025-07-16 PROCEDURE — 82310 ASSAY OF CALCIUM: CPT | Performed by: PHYSICIAN ASSISTANT

## 2025-07-16 PROCEDURE — 74177 CT ABD & PELVIS W/CONTRAST: CPT | Mod: 26 | Performed by: RADIOLOGY

## 2025-07-16 PROCEDURE — 83690 ASSAY OF LIPASE: CPT | Performed by: PHYSICIAN ASSISTANT

## 2025-07-16 PROCEDURE — 74177 CT ABD & PELVIS W/CONTRAST: CPT

## 2025-07-16 PROCEDURE — G0463 HOSPITAL OUTPT CLINIC VISIT: HCPCS | Mod: 25 | Performed by: PHYSICIAN ASSISTANT

## 2025-07-16 PROCEDURE — 99214 OFFICE O/P EST MOD 30 MIN: CPT | Performed by: PHYSICIAN ASSISTANT

## 2025-07-16 PROCEDURE — 85025 COMPLETE CBC W/AUTO DIFF WBC: CPT | Performed by: PHYSICIAN ASSISTANT

## 2025-07-16 PROCEDURE — 81003 URINALYSIS AUTO W/O SCOPE: CPT | Performed by: PHYSICIAN ASSISTANT

## 2025-07-16 PROCEDURE — 96374 THER/PROPH/DIAG INJ IV PUSH: CPT | Performed by: PHYSICIAN ASSISTANT

## 2025-07-16 PROCEDURE — 250N000011 HC RX IP 250 OP 636: Performed by: RADIOLOGY

## 2025-07-16 PROCEDURE — 250N000011 HC RX IP 250 OP 636: Performed by: PHYSICIAN ASSISTANT

## 2025-07-16 PROCEDURE — 36415 COLL VENOUS BLD VENIPUNCTURE: CPT | Performed by: PHYSICIAN ASSISTANT

## 2025-07-16 RX ORDER — METHOCARBAMOL 500 MG/1
500 TABLET, FILM COATED ORAL 3 TIMES DAILY
Qty: 30 TABLET | Refills: 0 | Status: SHIPPED | OUTPATIENT
Start: 2025-07-16

## 2025-07-16 RX ORDER — IOPAMIDOL 755 MG/ML
150 INJECTION, SOLUTION INTRAVASCULAR ONCE
Status: COMPLETED | OUTPATIENT
Start: 2025-07-16 | End: 2025-07-16

## 2025-07-16 RX ORDER — KETOROLAC TROMETHAMINE 15 MG/ML
15 INJECTION, SOLUTION INTRAMUSCULAR; INTRAVENOUS ONCE
Status: COMPLETED | OUTPATIENT
Start: 2025-07-16 | End: 2025-07-16

## 2025-07-16 RX ADMIN — KETOROLAC TROMETHAMINE 15 MG: 15 INJECTION, SOLUTION INTRAMUSCULAR; INTRAVENOUS at 13:56

## 2025-07-16 RX ADMIN — IOPAMIDOL 150 ML: 755 INJECTION, SOLUTION INTRAVENOUS at 14:55

## 2025-07-16 ASSESSMENT — ENCOUNTER SYMPTOMS
NAUSEA: 0
VOMITING: 0
SHORTNESS OF BREATH: 0
DIARRHEA: 0
FLANK PAIN: 1
CONSTIPATION: 0
ABDOMINAL PAIN: 0
COUGH: 0
FEVER: 0
DYSURIA: 0

## 2025-07-16 ASSESSMENT — ACTIVITIES OF DAILY LIVING (ADL)
ADLS_ACUITY_SCORE: 41

## 2025-07-16 NOTE — ED PROVIDER NOTES
History     Chief Complaint   Patient presents with    Flank Pain     HPI  Bradley Garcia is a 59 year old male who   Presents with ongoing right flank pain.  Was seen once had a hip x-ray, was prescribed prednisone which he says that did not help.  Seen again, diagnosed with a kidney stone and told that did not help.  Not having any dysuria or hematuria.    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 [facility] record.   IMO Update 10/11          Past Medical History:    Past Medical History:   Diagnosis Date    Basic learning disability, reading 10/10/2018    Colonoscopy refused 10/10/2018    Essential (primary) hypertension 10/10/2018    Gastroesophageal reflux disease without esophagitis     Generalized anxiety disorder     History of alcohol abuse 10/10/2018    History of complete eye exam 01/25/2025    Hypocalcemia 02/23/2019    Iron deficiency anemia due to dietary causes     Microscopic hematuria     Mild intellectual disabilities 10/10/2018    Mixed hyperlipidemia 10/10/2018    Onychomycosis due to dermatophyte 10/10/2018    Other specified hypothyroidism 02/23/2019    Renal insufficiency 01/25/2025    Type 2 diabetes mellitus with hyperglycemia, without long-term current use of insulin (H) 02/23/2019       Past Surgical History:    Past Surgical History:   Procedure Laterality Date    MOUTH SURGERY      oral surgery    PHACOEMULSIFICATION WITH STANDARD INTRAOCULAR LENS IMPLANT Right 3/27/2025    Procedure:  Phacoemulsification cataract extraction posterior chamber lens right eye;  Surgeon: Fortino Reveles MD;  Location: [facility] OR       Family History:    Family History   Problem Relation Age of Onset    No Known Problems Mother     Alcoholism Father     Cancer Father     Diabetes Brother     Diabetes Cousin     Glaucoma No family hx of     Macular Degeneration No family hx of        Social History:  Marital Status:  Single [1]  Social History     Tobacco Use    Smoking status: Never    Smokeless tobacco: Never   Vaping Use    Vaping status: Never Used   Substance Use Topics    Alcohol use: Yes     Comment: beer and whisky -- pt states every couple weeks ocassional    Drug use: No        Medications:    methocarbamol (ROBAXIN) 500 MG tablet  ASPIRIN PO  baclofen (LIORESAL) 10 MG tablet  Blood Glucose Monitoring Suppl (BLOOD GLUCOSE TEST STRIPS STRP)  calcium carbonate-vitamin D (CALTRATE) 600-10 MG-MCG per tablet  glipiZIDE (GLUCOTROL) 10 MG tablet  HYDROcodone-acetaminophen (NORCO) 5-325 MG tablet  lancets misc. KIT  levothyroxine (SYNTHROID/LEVOTHROID) 88 MCG tablet  lisinopril (ZESTRIL) 40 MG tablet  METFORMIN HCL PO  naltrexone (DEPADE/REVIA) 50 MG tablet  pioglitazone (ACTOS) 45 MG tablet  prazosin (MINIPRESS) 1 MG capsule  predniSONE (DELTASONE) 20 MG tablet  simvastatin (ZOCOR) 20 MG tablet          Review of Systems   Constitutional:  Negative for fever.   Respiratory:  Negative for cough and shortness of breath.    Cardiovascular:  Negative for chest pain.   Gastrointestinal:  Negative for abdominal pain, constipation, diarrhea, nausea and vomiting.   Genitourinary:  Positive for flank pain. Negative for dysuria.   Skin:  Negative for rash.   All other systems reviewed and are negative.      Physical Exam   BP: (!) 152/96  Pulse: 63  Temp: 97.8  F (36.6  C)  Resp: 18  Weight: (!) 144.4 kg (318 lb 4.8 oz)  SpO2: 97 %      Physical Exam  Vitals and nursing note reviewed.   Constitutional:       Appearance: Normal  appearance. He is obese.   HENT:      Head: Normocephalic.   Cardiovascular:      Rate and Rhythm: Normal rate and regular rhythm.      Heart sounds: Normal heart sounds. No murmur heard.     No friction rub. No gallop.   Pulmonary:      Effort: Pulmonary effort is normal.      Breath sounds: Normal breath sounds. No wheezing, rhonchi or rales.   Abdominal:      Palpations: Abdomen is soft.      Tenderness: There is no abdominal tenderness. There is no right CVA tenderness, left CVA tenderness, guarding or rebound.      Comments:   He is tender when palpating between the lower right ribs on the lateral side and the pelvic girdle at a very focal spot, there is no bruising or swelling or deformities noted.   Neurological:      Mental Status: He is alert.   Psychiatric:         Mood and Affect: Mood normal.         Behavior: Behavior normal.         ED Course              Recent Results (from the past 24 hours)   CBC with platelets differential    Narrative    The following orders were created for panel order CBC with platelets differential.  Procedure                               Abnormality         Status                     ---------                               -----------         ------                     CBC with platelets and ...[[id number]]                      Final result                 Please view results for these tests on the individual orders.   Comprehensive metabolic panel   Result Value Ref Range    Sodium 138 135 - 145 mmol/L    Potassium 4.4 3.4 - 5.3 mmol/L    Carbon Dioxide (CO2) 24 22 - 29 mmol/L    Anion Gap 11 7 - 15 mmol/L    Urea Nitrogen 16.0 8.0 - 23.0 mg/dL    Creatinine 0.88 0.67 - 1.17 mg/dL    GFR Estimate >90 >60 mL/min/1.73m2    Calcium 9.9 8.8 - 10.4 mg/dL    Chloride 103 98 - 107 mmol/L    Glucose 142 (H) 70 - 99 mg/dL    Alkaline Phosphatase 72 40 - 150 U/L    AST 19 0 - 45 U/L    ALT 19 0 - 70 U/L    Protein Total 7.4 6.4 - 8.3 g/dL    Albumin 4.1 3.5 - 5.2 g/dL    Bilirubin  Total 0.6 <=1.2 mg/dL   Lipase   Result Value Ref Range    Lipase 40 13 - 60 U/L   CBC with platelets and differential   Result Value Ref Range    WBC Count 9.1 4.0 - 11.0 10e3/uL    RBC Count 4.44 4.40 - 5.90 10e6/uL    Hemoglobin 14.2 13.3 - 17.7 g/dL    Hematocrit 41.8 40.0 - 53.0 %    MCV 94 78 - 100 fL    MCH 32.0 26.5 - 33.0 pg    MCHC 34.0 31.5 - 36.5 g/dL    RDW 12.9 10.0 - 15.0 %    Platelet Count 215 150 - 450 10e3/uL    % Neutrophils 57 %    % Lymphocytes 33 %    % Monocytes 7 %    % Eosinophils 2 %    % Basophils 0 %    % Immature Granulocytes 0 %    NRBCs per 100 WBC 0 <1 /100    Absolute Neutrophils 5.2 1.6 - 8.3 10e3/uL    Absolute Lymphocytes 3.0 0.8 - 5.3 10e3/uL    Absolute Monocytes 0.7 0.0 - 1.3 10e3/uL    Absolute Eosinophils 0.2 0.0 - 0.7 10e3/uL    Absolute Basophils 0.0 0.0 - 0.2 10e3/uL    Absolute Immature Granulocytes 0.0 <=0.4 10e3/uL    Absolute NRBCs 0.0 10e3/uL   Extra Tube    Narrative    The following orders were created for panel order Extra Tube.  Procedure                               Abnormality         Status                     ---------                               -----------         ------                     Extra Blue Top Tube[[id number]]                             Final result               Extra Red Top Tube[[id number]]                              Final result                 Please view results for these tests on the individual orders.   Extra Blue Top Tube   Result Value Ref Range    Hold Specimen JIC    Extra Red Top Tube   Result Value Ref Range    Hold Specimen JIC    UA with Microscopic reflex to Culture    Specimen: Urine, Clean Catch   Result Value Ref Range    Color Urine Light Yellow Colorless, Straw, Light Yellow, Yellow    Appearance Urine Clear Clear    Glucose Urine Negative Negative mg/dL    Bilirubin Urine Negative Negative    Ketones Urine Negative Negative mg/dL    Specific Gravity Urine 1.023 1.003 - 1.035    Blood Urine Negative Negative    pH  Urine 5.0 4.7 - 8.0    Protein Albumin Urine Negative Negative mg/dL    Urobilinogen Urine Normal Normal mg/dL    Nitrite Urine Negative Negative    Leukocyte Esterase Urine Negative Negative    Mucus Urine Present (A) None Seen /LPF    RBC Urine 0 <=2 /HPF    WBC Urine <1 <=5 /HPF    Squamous Epithelials Urine 0 <=1 /HPF    Narrative    Urine Culture not indicated   CT Abdomen Pelvis w Contrast    Narrative    PROCEDURE:  CT ABDOMEN PELVIS W CONTRAST    HISTORY: pain above right pelvic girdle and slightly posterior x 1  week.    TECHNIQUE: Helical CT of the abdomen and pelvis was performed  following injection of intravenous contrast. This CT exam was  performed using one or more the following dose reduction techniques:  automated exposure control, adjustment of the mA and/or kV according  to patient size, and/or iterative reconstruction technique.    COMPARISON: 7/14/2025    MEDS/CONTRAST: Isovue 370  150mL    FINDINGS:      Limited images through the lung bases demonstrate no focal  consolidation or mass.     A left adrenal adenoma is redemonstrated. The liver demonstrates no  mass or intrahepatic biliary ductal dilatation. The gallbladder,  spleen, pancreas and right adrenal gland are unremarkable. Symmetric  nephrograms are present without hydronephrosis. There is no abdominal  aortic aneurysm.    The bowel is normal in caliber. .    No free fluid, free air or adenopathy is seen.     Degenerative changes are seen in the lumbar spine and SI joints.  Incidental note is made of transitional anatomy of L5. No suspicious  osseous lesions are identified.      Impression    IMPRESSION:      No significant interval change in the CT appearance of the abdomen and  pelvis when compared to 2 days prior.    JOVANY ALDRIDGE MD         SYSTEM ID:  [id number]       Medications   ketorolac (TORADOL) injection 15 mg (15 mg Intravenous $Given 7/16/25 1356)   iopamidol (ISOVUE-370) solution 150 mL (150 mLs Intravenous $Given  7/16/25 2297)   sodium chloride (PF) 0.9% PF flush 60 mL (60 mLs Intravenous $Given 7/16/25 2815)       Assessments & Plan (with Medical Decision Making)     I have reviewed the nursing notes.    I have reviewed the findings, diagnosis, plan and need for follow up with the patient.      Medical Decision Making    Patient here with ongoing flank pain for about a week.  He had an x-ray at his first visit which showed mild arthritis.  He had a CT scan which showed a nonobstructing kidney stone up in the kidney, nothing in the ureter.  He had a negative urinalysis for blood or infection.  He had a mild white count at his previous visit, we repeated all of his labs today and add a lipase.  Everything came back normal.  CT with contrast was completed and that came back normal.  I suspect this is muscular etiology, could have torn a muscle.  Does not seem to be any referred pain.  We prescribed muscle relaxers and advised him to follow-up with primary care provider        Discharge Medication List as of 7/16/2025  3:23 PM        START taking these medications    Details   methocarbamol (ROBAXIN) 500 MG tablet Take 1 tablet (500 mg) by mouth 3 times daily., Disp-30 tablet, R-0, E-Prescribe             Final diagnoses:   Acute right flank pain       7/16/2025   [facility]       Usman Alas PA-C  07/16/25 [id number]

## 2025-07-16 NOTE — ED TRIAGE NOTES
"\"I am having right flank pain.  I have kidney stones.  I am having this pain for 1.5 weeks.  I have been to Urgent Care twice, still having pain.\"         "

## 2025-07-16 NOTE — DISCHARGE INSTRUCTIONS
I think this is musculoskeletal, possible a torn or spasming muscle. They kidney stone is up in the kidney and not causing any problems. Labs looked normal today. Recommend tylenol, lidocaine patch, ice, gentle stretching and follow-up with Cayla next week.

## 2025-07-17 ENCOUNTER — HOSPITAL ENCOUNTER (EMERGENCY)
Facility: HOSPITAL | Age: 60
Discharge: HOME | End: 2025-07-17
Attending: PHYSICIAN ASSISTANT | Admitting: PHYSICIAN ASSISTANT
Payer: MEDICARE

## 2025-07-17 VITALS
OXYGEN SATURATION: 95 % | SYSTOLIC BLOOD PRESSURE: 131 MMHG | DIASTOLIC BLOOD PRESSURE: 76 MMHG | HEART RATE: 73 BPM | TEMPERATURE: 97.2 F | RESPIRATION RATE: 16 BRPM

## 2025-07-17 DIAGNOSIS — M25.551 RIGHT HIP PAIN: ICD-10-CM

## 2025-07-17 DIAGNOSIS — M16.11 PRIMARY OSTEOARTHRITIS OF RIGHT HIP: ICD-10-CM

## 2025-07-17 PROCEDURE — 250N000011 HC RX IP 250 OP 636: Performed by: PHYSICIAN ASSISTANT

## 2025-07-17 PROCEDURE — 250N000012 HC RX MED GY IP 250 OP 636 PS 637: Performed by: PHYSICIAN ASSISTANT

## 2025-07-17 PROCEDURE — 99213 OFFICE O/P EST LOW 20 MIN: CPT | Performed by: PHYSICIAN ASSISTANT

## 2025-07-17 PROCEDURE — 96372 THER/PROPH/DIAG INJ SC/IM: CPT | Performed by: PHYSICIAN ASSISTANT

## 2025-07-17 PROCEDURE — G0463 HOSPITAL OUTPT CLINIC VISIT: HCPCS | Performed by: PHYSICIAN ASSISTANT

## 2025-07-17 PROCEDURE — G0463 HOSPITAL OUTPT CLINIC VISIT: HCPCS | Mod: 25 | Performed by: PHYSICIAN ASSISTANT

## 2025-07-17 RX ORDER — KETOROLAC TROMETHAMINE 10 MG/1
10 TABLET, FILM COATED ORAL EVERY 6 HOURS PRN
Qty: 20 TABLET | Refills: 0 | Status: SHIPPED | OUTPATIENT
Start: 2025-07-17 | End: 2025-07-24

## 2025-07-17 RX ORDER — PREDNISONE 20 MG/1
40 TABLET ORAL ONCE
Status: COMPLETED | OUTPATIENT
Start: 2025-07-17 | End: 2025-07-17

## 2025-07-17 RX ORDER — KETOROLAC TROMETHAMINE 15 MG/ML
15 INJECTION, SOLUTION INTRAMUSCULAR; INTRAVENOUS ONCE
Status: COMPLETED | OUTPATIENT
Start: 2025-07-17 | End: 2025-07-17

## 2025-07-17 RX ADMIN — PREDNISONE 40 MG: 20 TABLET ORAL at 11:18

## 2025-07-17 RX ADMIN — KETOROLAC TROMETHAMINE 15 MG: 15 INJECTION, SOLUTION INTRAMUSCULAR; INTRAVENOUS at 11:17

## 2025-07-17 ASSESSMENT — ENCOUNTER SYMPTOMS
NEUROLOGICAL NEGATIVE: 1
PSYCHIATRIC NEGATIVE: 1
RESPIRATORY NEGATIVE: 1
HEMATOLOGIC/LYMPHATIC NEGATIVE: 1
EYES NEGATIVE: 1
MYALGIAS: 1
ARTHRALGIAS: 1
ABDOMINAL PAIN: 1
CONSTITUTIONAL NEGATIVE: 1
ENDOCRINE NEGATIVE: 1
CARDIOVASCULAR NEGATIVE: 1

## 2025-07-17 ASSESSMENT — ACTIVITIES OF DAILY LIVING (ADL): ADLS_ACUITY_SCORE: 41

## 2025-07-17 NOTE — ED TRIAGE NOTES
After triaging patient notified nurse this visit id more for a change of medications for pain management.  Images and lab work was done yesterday.  Urgent Care provider updated and seeing patient in triage.    Kayla ANDRADE assessed patient in triage and determined patient Urgent Care appropriate. Will be seen in Urgent Care.

## 2025-07-17 NOTE — DISCHARGE INSTRUCTIONS
Patient can take Toradol 10 mg every 6 hours with food for hip pain side pain.  Patient may continue the methocarbamol for muscle skeletal pain  Patient may use hydrocodone and finish prednisone as instructed  Patient may stop the Tamsulosin or flomax   orthopedic consult placed to speak with  About hip pain.

## 2025-07-17 NOTE — ED PROVIDER NOTES
History     Chief Complaint   Patient presents with    Abdominal Pain     HPI  Bradley Garcia is a 59 year old male who presents to the  with complaints of still right sided hip or lower abdominal pain.  Patient has been seen 3 times in the last week with this pain and had a large workup to rule out kidney stone, other intra-abdominal pathology, he did have a 3 mm right kidney stone but it was up in the kidney which most likely was not the cause of his pain he did have some osteoarthritis of the right hip he has been using prednisone, muscle relaxers, pain medication without improvement.  Patient states that he took the muscle relaxer and some pain medication this morning and it did not help it kept him awake all night he denies any urinary frequency urgency or pain with urination, hematuria no nausea vomiting no fevers.  The pain is right at the level of the pelvic bone and it is painful with palpation.  It does not seem to radiate.  No history of constipation.  He keeps describing it as kidney pain due to his kidney stones.  Patient does not have any lesions or rashes in the area.  The area on the right abdomen does seem to be painful with movement.  Patient did have large workups with labs he has had a CT with and without contrast.  Only finding was a 3 mm nonobstructive stone.  X-ray of his hip showed degenerative disease.    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 [facility] record.   IMO Update 10/11           Past Medical History:    Past Medical History:   Diagnosis Date    Basic learning disability, reading 10/10/2018    Colonoscopy refused 10/10/2018    Essential (primary) hypertension 10/10/2018    Gastroesophageal reflux disease without esophagitis     Generalized anxiety disorder     History of alcohol abuse 10/10/2018    History of complete eye exam 01/25/2025    Hypocalcemia 02/23/2019    Iron deficiency anemia due to dietary causes     Microscopic hematuria     Mild intellectual disabilities 10/10/2018    Mixed hyperlipidemia 10/10/2018    Onychomycosis due to dermatophyte 10/10/2018    Other specified hypothyroidism 02/23/2019    Renal insufficiency 01/25/2025    Type 2 diabetes mellitus with hyperglycemia, without long-term current use of insulin (H) 02/23/2019       Past Surgical History:    Past Surgical History:   Procedure Laterality Date    MOUTH SURGERY      oral surgery    PHACOEMULSIFICATION WITH STANDARD INTRAOCULAR LENS IMPLANT Right 3/27/2025    Procedure: Phacoemulsification cataract extraction posterior chamber lens right eye;  Surgeon: Fortino Reveles MD;  Location: [facility] OR       Family History:    Family History   Problem Relation Age of Onset    No Known Problems Mother     Alcoholism Father     Cancer Father     Diabetes Brother     Diabetes Cousin     Glaucoma No family hx of     Macular Degeneration No family hx of        Social History:  Marital Status:  Single [1]  Social History     Tobacco Use    Smoking status: Never    Smokeless tobacco: Never   Vaping Use    Vaping status: Never Used   Substance Use Topics    Alcohol use: Yes     Comment: beer and whisky -- pt states every couple weeks ocassional    Drug use: No        Medications:    ketorolac (TORADOL) 10 MG tablet  ASPIRIN PO  baclofen (LIORESAL) 10 MG tablet  Blood Glucose Monitoring Suppl (BLOOD GLUCOSE TEST STRIPS STRP)  calcium carbonate-vitamin D (CALTRATE) 600-10 MG-MCG per tablet  glipiZIDE (GLUCOTROL) 10 MG  tablet  HYDROcodone-acetaminophen (NORCO) 5-325 MG tablet  lancets misc. KIT  levothyroxine (SYNTHROID/LEVOTHROID) 88 MCG tablet  lisinopril (ZESTRIL) 40 MG tablet  METFORMIN HCL PO  methocarbamol (ROBAXIN) 500 MG tablet  naltrexone (DEPADE/REVIA) 50 MG tablet  pioglitazone (ACTOS) 45 MG tablet  prazosin (MINIPRESS) 1 MG capsule  predniSONE (DELTASONE) 20 MG tablet  simvastatin (ZOCOR) 20 MG tablet          Review of Systems   Constitutional: Negative.    HENT: Negative.     Eyes: Negative.    Respiratory: Negative.     Cardiovascular: Negative.    Gastrointestinal:  Positive for abdominal pain.   Endocrine: Negative.    Genitourinary: Negative.    Musculoskeletal:  Positive for arthralgias and myalgias.   Skin: Negative.    Neurological: Negative.    Hematological: Negative.    Psychiatric/Behavioral: Negative.         Physical Exam   BP: 131/76  Pulse: 73  Temp: 97.2  F (36.2  C)  Resp: 16  SpO2: 95 %      Physical Exam  Vitals and nursing note reviewed.   Constitutional:       Appearance: He is well-developed.   Cardiovascular:      Rate and Rhythm: Normal rate and regular rhythm.      Heart sounds: Normal heart sounds.   Pulmonary:      Effort: Pulmonary effort is normal.      Breath sounds: Normal breath sounds.   Abdominal:      General: Abdomen is flat and scaphoid. Bowel sounds are normal.      Palpations: Abdomen is soft.      Tenderness: There is abdominal tenderness (Pain is right on top of the pelvic crest.  It is painful with palpation and movement there is no lesions or rashes.  The pain does not radiate) in the right lower quadrant. There is no right CVA tenderness, left CVA tenderness, guarding or rebound.   Neurological:      Mental Status: He is alert.         ED Course        Procedures              No results found for this or any previous visit (from the past 24 hours).      Medications   ketorolac (TORADOL) injection 15 mg (15 mg Intramuscular $Given 7/17/25 111)   predniSONE (DELTASONE)  tablet 40 mg (40 mg Oral $Given 7/17/25 1118)       Assessments & Plan (with Medical Decision Making)     I have reviewed the nursing notes.    I have reviewed the findings, diagnosis, plan and need for follow up with the patient.  59-year-old male no acute distress vitals are stable patient returns back with complaints that his kidney stone is bothering him in his right flank area however when he points to the area it is pain just above or sits right at the iliac crest.  Patient has been seen multiple times over the last couple of days he has a 3 mm nonobstructive right kidney stone but this is definitely not been the cause of his pain his labs have been normal his urinalysis is normal.  He was pain-free with muscle relaxers and pain medications but now he states is not helping.  Patient has had no fever no urinary frequency urgency or hematuria.  No constipation diarrhea.  I do not think repeat labs CT scan abdomen pelvis or x-rays are necessary.  I gave the patient Toradol 15 mg IM and 20 mg of prednisone that completely resolved his pain.  Discussed with patient that his pain is not secondary to the kidney stone potentially it could be some other intra-abdominal pain constipation could be even osteoarthritis from his hip.  Patient will continue a small course of oral Toradol 10 mg.  Patient can continue muscle relaxers he can use Tylenol.  I did place a orthopedic consult.  Patient should follow-up with PCP if something worsens or changes.  Discharged in stable condition.              Discharge Medication List as of 7/17/2025 12:08 PM        START taking these medications    Details   ketorolac (TORADOL) 10 MG tablet Take 1 tablet (10 mg) by mouth every 6 hours as needed for moderate pain. Please take with food, Disp-20 tablet, R-0, E-Prescribe             Final diagnoses:   Right hip pain   Primary osteoarthritis of right hip       7/17/2025   [facility]       Kayla Sheikh PA-C  07/17/25  [id number]

## 2025-07-17 NOTE — ED TRIAGE NOTES
Here with right sided lower flank pain.  Was seen in yesterday and had labs and imaging done.  Pain continues and wonders if he can have a script for something else. No falls or injuries.

## 2025-07-20 ENCOUNTER — HOSPITAL ENCOUNTER (EMERGENCY)
Facility: HOSPITAL | Age: 60
Discharge: HOME | End: 2025-07-20
Attending: PHYSICIAN ASSISTANT
Payer: MEDICARE

## 2025-07-20 VITALS
RESPIRATION RATE: 18 BRPM | TEMPERATURE: 97.6 F | HEART RATE: 75 BPM | OXYGEN SATURATION: 96 % | DIASTOLIC BLOOD PRESSURE: 84 MMHG | SYSTOLIC BLOOD PRESSURE: 152 MMHG

## 2025-07-20 DIAGNOSIS — M62.838 MUSCLE SPASM: ICD-10-CM

## 2025-07-20 LAB
ALBUMIN SERPL BCG-MCNC: 4.1 G/DL (ref 3.5–5.2)
ALP SERPL-CCNC: 64 U/L (ref 40–150)
ALT SERPL W P-5'-P-CCNC: 19 U/L (ref 0–70)
ANION GAP SERPL CALCULATED.3IONS-SCNC: 14 MMOL/L (ref 7–15)
AST SERPL W P-5'-P-CCNC: 20 U/L (ref 0–45)
BASOPHILS # BLD AUTO: 0 10E3/UL (ref 0–0.2)
BASOPHILS NFR BLD AUTO: 0 %
BILIRUB SERPL-MCNC: 0.6 MG/DL
BUN SERPL-MCNC: 23.7 MG/DL (ref 8–23)
CALCIUM SERPL-MCNC: 9.4 MG/DL (ref 8.8–10.4)
CHLORIDE SERPL-SCNC: 103 MMOL/L (ref 98–107)
CREAT SERPL-MCNC: 0.98 MG/DL (ref 0.67–1.17)
EGFRCR SERPLBLD CKD-EPI 2021: 88 ML/MIN/1.73M2
EOSINOPHIL # BLD AUTO: 0.2 10E3/UL (ref 0–0.7)
EOSINOPHIL NFR BLD AUTO: 2 %
ERYTHROCYTE [DISTWIDTH] IN BLOOD BY AUTOMATED COUNT: 12.8 % (ref 10–15)
GLUCOSE SERPL-MCNC: 92 MG/DL (ref 70–99)
HCO3 SERPL-SCNC: 20 MMOL/L (ref 22–29)
HCT VFR BLD AUTO: 39.6 % (ref 40–53)
HGB BLD-MCNC: 13.4 G/DL (ref 13.3–17.7)
HOLD SPECIMEN: NORMAL
HOLD SPECIMEN: NORMAL
IMM GRANULOCYTES # BLD: 0.1 10E3/UL
IMM GRANULOCYTES NFR BLD: 1 %
LYMPHOCYTES # BLD AUTO: 2.1 10E3/UL (ref 0.8–5.3)
LYMPHOCYTES NFR BLD AUTO: 20 %
MCH RBC QN AUTO: 32.1 PG (ref 26.5–33)
MCHC RBC AUTO-ENTMCNC: 33.8 G/DL (ref 31.5–36.5)
MCV RBC AUTO: 95 FL (ref 78–100)
MONOCYTES # BLD AUTO: 0.7 10E3/UL (ref 0–1.3)
MONOCYTES NFR BLD AUTO: 7 %
NEUTROPHILS # BLD AUTO: 7.3 10E3/UL (ref 1.6–8.3)
NEUTROPHILS NFR BLD AUTO: 70 %
NRBC # BLD AUTO: 0 10E3/UL
NRBC BLD AUTO-RTO: 0 /100
PLATELET # BLD AUTO: 190 10E3/UL (ref 150–450)
POTASSIUM SERPL-SCNC: 4.3 MMOL/L (ref 3.4–5.3)
PROT SERPL-MCNC: 7.2 G/DL (ref 6.4–8.3)
RBC # BLD AUTO: 4.18 10E6/UL (ref 4.4–5.9)
SODIUM SERPL-SCNC: 137 MMOL/L (ref 135–145)
WBC # BLD AUTO: 10.5 10E3/UL (ref 4–11)

## 2025-07-20 PROCEDURE — 99213 OFFICE O/P EST LOW 20 MIN: CPT | Performed by: PHYSICIAN ASSISTANT

## 2025-07-20 PROCEDURE — 82435 ASSAY OF BLOOD CHLORIDE: CPT | Performed by: PHYSICIAN ASSISTANT

## 2025-07-20 PROCEDURE — 36415 COLL VENOUS BLD VENIPUNCTURE: CPT | Performed by: PHYSICIAN ASSISTANT

## 2025-07-20 PROCEDURE — 250N000011 HC RX IP 250 OP 636: Performed by: PHYSICIAN ASSISTANT

## 2025-07-20 PROCEDURE — 96372 THER/PROPH/DIAG INJ SC/IM: CPT | Performed by: PHYSICIAN ASSISTANT

## 2025-07-20 PROCEDURE — G0463 HOSPITAL OUTPT CLINIC VISIT: HCPCS | Mod: 25 | Performed by: PHYSICIAN ASSISTANT

## 2025-07-20 PROCEDURE — 85018 HEMOGLOBIN: CPT | Performed by: PHYSICIAN ASSISTANT

## 2025-07-20 PROCEDURE — G0463 HOSPITAL OUTPT CLINIC VISIT: HCPCS | Performed by: PHYSICIAN ASSISTANT

## 2025-07-20 RX ORDER — KETOROLAC TROMETHAMINE 30 MG/ML
30 INJECTION, SOLUTION INTRAMUSCULAR; INTRAVENOUS ONCE
Status: COMPLETED | OUTPATIENT
Start: 2025-07-20 | End: 2025-07-20

## 2025-07-20 RX ADMIN — KETOROLAC TROMETHAMINE 30 MG: 30 INJECTION, SOLUTION INTRAMUSCULAR at 12:54

## 2025-07-20 ASSESSMENT — COLUMBIA-SUICIDE SEVERITY RATING SCALE - C-SSRS
6. HAVE YOU EVER DONE ANYTHING, STARTED TO DO ANYTHING, OR PREPARED TO DO ANYTHING TO END YOUR LIFE?: NO
1. IN THE PAST MONTH, HAVE YOU WISHED YOU WERE DEAD OR WISHED YOU COULD GO TO SLEEP AND NOT WAKE UP?: NO
2. HAVE YOU ACTUALLY HAD ANY THOUGHTS OF KILLING YOURSELF IN THE PAST MONTH?: NO

## 2025-07-20 ASSESSMENT — ACTIVITIES OF DAILY LIVING (ADL)
ADLS_ACUITY_SCORE: 41
ADLS_ACUITY_SCORE: 41

## 2025-07-20 NOTE — DISCHARGE INSTRUCTIONS
Ibuprofen (Advil) 600 mg plus acetaminophen (Tylenol) 650 mg together every 6 hours with food for as needed for pain

## 2025-07-20 NOTE — ED TRIAGE NOTES
Pt presents today with c/o right hip pain for 2 weeks. No known injury. Has been seen for this already. Requesting pain pills.

## 2025-07-20 NOTE — ED PROVIDER NOTES
History     Chief Complaint   Patient presents with    Hip Pain     HPI  Bradley Garcia is a 59 year old male who presents today for right hip pain.  He states he finished all his previously prescribed medications.    I have been in touch with the sister Maira and she informs me that he is unable to read    He is previously been seen in the emergency room 4 times      Today he complains of right hip pain.  It is only when he twists.  He will cringe with pain in tenses up.  The only discomfort on exam is the right SI joint.  He states walking makes it better.     Regards to medicines he is finished his prednisone which was given on 7/10/2025.  He finished the Norco he was given on 7/14/2025.  He was prescribed Robaxin on 7/16 and he states he finished it all-taking 2 tablets 3 times a day-he would have finished in 5 days -would be today.  In regards to his Toradol pills prescribed on 7/17 he was prescribed 20 tablets this should have lasted him through 7/21/2025 which is tomorrow.    7/10/2025:  X-ray of pelvis and right hip show no acute fracture.  Mild degenerative changes of the right hip.  Degenerative changes of the SI joints and left hip.     Rx: Prednisone 40 mg daily x 5 days      7/14/2025:  CBC elevated WBC at 13,700  CMP, UA, CRP, procalcitonin and lactic acid normal.    CT abdomen and pelvis: 3 mm nonobstructing calculi in the right kidney.  Transitional anatomy of L5 noted.    Rx: Flomax 0.4 mg 1 daily x 14 days, Norco 5/325 #10.       7/16/2025:  CBC, comp, lipase and urine normal    CT abdomen and pelvis-no change from previous CT done on 7/14/2025.  Left adrenal adenoma, degenerative changes in the lumbar spine and SI joints    Toradol 15 mg IV  Rx: Robaxin 500 mg 1 tablet by mouth 3 times daily #30 (end date should have lasted 7/26/2025)    7/17/2025:  Toradol 15 mg IM plus prednisone 20 mg-pain was resolved in ED  Toradol 10 mg one tablet every 6 hours #20 (should have lasted through  7/21/2025)    Allergies:  No Known Allergies    Problem List:    Patient Active Problem List    Diagnosis Date Noted    Essential hypertension 05/11/2021     Priority: Medium    DM type 2 (diabetes mellitus, type 2) (H) 04/29/2013     Priority: Medium     Takes Metformin      Hyperlipidemia 04/22/2013     Priority: Medium    Hypothyroidism 07/22/2011     Priority: Medium    Obesity, unspecified 04/13/2010     Priority: Medium     Overview:   Updated per 10/1/17 IMO import      Mild intellectual disabilities 09/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 [facility] record.   IMO Update 10/11          Past Medical History:    Past Medical History:   Diagnosis Date    Basic learning disability, reading 10/10/2018    Colonoscopy refused 10/10/2018    Essential (primary) hypertension 10/10/2018    Gastroesophageal reflux disease without esophagitis     Generalized anxiety disorder     History of alcohol abuse 10/10/2018    History of complete eye exam 01/25/2025    Hypocalcemia 02/23/2019    Iron deficiency anemia due to dietary causes     Microscopic hematuria     Mild intellectual disabilities 10/10/2018    Mixed hyperlipidemia 10/10/2018    Onychomycosis due to dermatophyte 10/10/2018    Other specified hypothyroidism 02/23/2019    Renal insufficiency 01/25/2025    Type 2 diabetes mellitus with hyperglycemia, without long-term current use of insulin (H) 02/23/2019       Past Surgical History:    Past Surgical History:   Procedure Laterality Date    MOUTH SURGERY      oral surgery    PHACOEMULSIFICATION WITH STANDARD INTRAOCULAR LENS IMPLANT Right 3/27/2025    Procedure: Phacoemulsification cataract extraction posterior chamber lens right eye;  Surgeon: Fortino Reveles MD;  Location: [facility] OR       Family History:    Family History   Problem Relation Age of Onset    No Known Problems Mother     Alcoholism Father     Cancer Father     Diabetes Brother     Diabetes Cousin     Glaucoma No family hx of     Macular  Degeneration No family hx of        Social History:  Marital Status:  Single [1]  Social History     Tobacco Use    Smoking status: Never    Smokeless tobacco: Never   Vaping Use    Vaping status: Never Used   Substance Use Topics    Alcohol use: Yes     Comment: beer and whisky -- pt states every couple weeks ocassional    Drug use: No        Medications:    ASPIRIN PO  baclofen (LIORESAL) 10 MG tablet  Blood Glucose Monitoring Suppl (BLOOD GLUCOSE TEST STRIPS STRP)  calcium carbonate-vitamin D (CALTRATE) 600-10 MG-MCG per tablet  glipiZIDE (GLUCOTROL) 10 MG tablet  ketorolac (TORADOL) 10 MG tablet  lancets misc. KIT  levothyroxine (SYNTHROID/LEVOTHROID) 88 MCG tablet  lisinopril (ZESTRIL) 40 MG tablet  METFORMIN HCL PO  methocarbamol (ROBAXIN) 500 MG tablet  naltrexone (DEPADE/REVIA) 50 MG tablet  pioglitazone (ACTOS) 45 MG tablet  prazosin (MINIPRESS) 1 MG capsule  predniSONE (DELTASONE) 20 MG tablet  simvastatin (ZOCOR) 20 MG tablet          Review of Systems please see above    Physical Exam   BP: (!) 152/84  Pulse: 75  Temp: 97.6  F (36.4  C)  Resp: 18  SpO2: 96 %      Physical Exam      Physical examination:  Vital signs: BP (!) 152/84   Pulse 75   Temp 97.6  F (36.4  C)   Resp 18   SpO2 96%      General Inspection: At times uncomfortable with right SI/hip pain which seems to be reproduced by a twisting motion right or left will produce pain.  Walking around helps his pain resolve.  Skin: Pink, warm and dry without rash or petechiae.  No jaundice.  Cardiovascular: Regular in rate and rhythm, S1 and S2 without ectopy or murmur.  Lungs/thorax: Unlabored.  Clear to auscultation without wheezes, rales or rhonchi.  Abdomen: Soft, nontender, No visible or palpable masses. No CVA tenderness.  Musculoskeletal: Pain with palpation superiorly to the SI joint and into the hip laterally.  PVS: No peripheral edema  Full smooth gait.    Psychiatric: Pleasant and cooperative.  Mood is good.  Affect is appropriate.  Speech clear to understand.      Pin in right SI joint upon palpation,        ED Course            Recent Results (from the past 24 hours)   Comprehensive metabolic panel   Result Value Ref Range    Sodium 137 135 - 145 mmol/L    Potassium 4.3 3.4 - 5.3 mmol/L    Carbon Dioxide (CO2) 20 (L) 22 - 29 mmol/L    Anion Gap 14 7 - 15 mmol/L    Urea Nitrogen 23.7 (H) 8.0 - 23.0 mg/dL    Creatinine 0.98 0.67 - 1.17 mg/dL    GFR Estimate 88 >60 mL/min/1.73m2    Calcium 9.4 8.8 - 10.4 mg/dL    Chloride 103 98 - 107 mmol/L    Glucose 92 70 - 99 mg/dL    Alkaline Phosphatase 64 40 - 150 U/L    AST 20 0 - 45 U/L    ALT 19 0 - 70 U/L    Protein Total 7.2 6.4 - 8.3 g/dL    Albumin 4.1 3.5 - 5.2 g/dL    Bilirubin Total 0.6 <=1.2 mg/dL   CBC with platelets differential    Narrative    The following orders were created for panel order CBC with platelets differential.  Procedure                               Abnormality         Status                     ---------                               -----------         ------                     CBC with platelets and ...[[id number]]  Abnormal            Final result                 Please view results for these tests on the individual orders.   CBC with platelets and differential   Result Value Ref Range    WBC Count 10.5 4.0 - 11.0 10e3/uL    RBC Count 4.18 (L) 4.40 - 5.90 10e6/uL    Hemoglobin 13.4 13.3 - 17.7 g/dL    Hematocrit 39.6 (L) 40.0 - 53.0 %    MCV 95 78 - 100 fL    MCH 32.1 26.5 - 33.0 pg    MCHC 33.8 31.5 - 36.5 g/dL    RDW 12.8 10.0 - 15.0 %    Platelet Count 190 150 - 450 10e3/uL    % Neutrophils 70 %    % Lymphocytes 20 %    % Monocytes 7 %    % Eosinophils 2 %    % Basophils 0 %    % Immature Granulocytes 1 %    NRBCs per 100 WBC 0 <1 /100    Absolute Neutrophils 7.3 1.6 - 8.3 10e3/uL    Absolute Lymphocytes 2.1 0.8 - 5.3 10e3/uL    Absolute Monocytes 0.7 0.0 - 1.3 10e3/uL    Absolute Eosinophils 0.2 0.0 - 0.7 10e3/uL    Absolute Basophils 0.0 0.0 - 0.2 10e3/uL     Absolute Immature Granulocytes 0.1 <=0.4 10e3/uL    Absolute NRBCs 0.0 10e3/uL   Extra Tube    Narrative    The following orders were created for panel order Extra Tube.  Procedure                               Abnormality         Status                     ---------                               -----------         ------                     Extra Red Top Tube[[id number]]                              In process                 Extra Green Top (Lithiu...[[id number]]                      In process                   Please view results for these tests on the individual orders.       Medications   ketorolac (TORADOL) injection 30 mg (30 mg Intramuscular $Given 7/20/25 1254)       Assessments & Plan (with Medical Decision Making)         Discharge Medication List as of 7/20/2025  1:06 PM          Final diagnoses:   Muscle spasm   Patient is a 59-year-old who presents today for right SI joint/right hip pain.  Upon chart review I see he has been seen 4 times in the ED since 7/10/2025 please see HPI for additional information regarding these visits.    Today I drew a CBC and comprehensive metabolic panel as after history patient had finished his Robaxin and Toradol oral tablets earlier than scheduled.  Thankfully his labs were normal.     I did speak with his Sister Maira on patient's cell phone-he called to have her present during our visit.    She informed me that he does not read.  His sister-in-law had been taking care of his medications but recently she stopped and so they are arranging for help with his medications.    I did order Toradol 30 mg IM to be given.  I did not give any additional prescription medications.  I did speak with his Sister Maira and recommended ibuprofen/Advil 600 mg with 650 mg of acetaminophen /Tylenol to be taken together every 6 hours with food as needed for pain.  I did discuss with her I placed another orthopedic referral and I would like him to be seen by orthopedics.  He stated she  would help arrange medications and also his visit to orthopedics.    Patient was in agreement with the plan.  He will return if there is any concerns or questions or worsening of his pain      7/20/2025   [facility]

## 2025-07-21 ENCOUNTER — HOSPITAL ENCOUNTER (EMERGENCY)
Facility: HOSPITAL | Age: 60
Discharge: LEFT BEFORE DISCHARGE | End: 2025-07-21
Payer: MEDICARE

## 2025-07-21 PROCEDURE — 999N000104 HC STATISTIC NO CHARGE

## 2025-07-28 ENCOUNTER — APPOINTMENT (OUTPATIENT)
Dept: LAB | Facility: OTHER | Age: 60
End: 2025-07-28
Attending: NURSE PRACTITIONER
Payer: MEDICARE

## 2025-07-28 ENCOUNTER — OFFICE VISIT (OUTPATIENT)
Dept: FAMILY MEDICINE | Facility: OTHER | Age: 60
End: 2025-07-28
Attending: NURSE PRACTITIONER
Payer: MEDICARE

## 2025-07-28 VITALS
HEART RATE: 76 BPM | BODY MASS INDEX: 42.09 KG/M2 | WEIGHT: 301.8 LBS | DIASTOLIC BLOOD PRESSURE: 52 MMHG | TEMPERATURE: 97.4 F | SYSTOLIC BLOOD PRESSURE: 84 MMHG | OXYGEN SATURATION: 96 %

## 2025-07-28 DIAGNOSIS — I10 ESSENTIAL HYPERTENSION: ICD-10-CM

## 2025-07-28 DIAGNOSIS — I95.89 OTHER SPECIFIED HYPOTENSION: ICD-10-CM

## 2025-07-28 DIAGNOSIS — N28.9 RENAL INSUFFICIENCY: ICD-10-CM

## 2025-07-28 DIAGNOSIS — M53.3 SACROILIAC JOINT DYSFUNCTION OF RIGHT SIDE: ICD-10-CM

## 2025-07-28 DIAGNOSIS — M54.41 ACUTE RIGHT-SIDED LOW BACK PAIN WITH RIGHT-SIDED SCIATICA: Primary | ICD-10-CM

## 2025-07-28 DIAGNOSIS — E11.65 TYPE 2 DIABETES MELLITUS WITH HYPERGLYCEMIA, WITHOUT LONG-TERM CURRENT USE OF INSULIN (H): ICD-10-CM

## 2025-07-28 DIAGNOSIS — E78.2 MIXED HYPERLIPIDEMIA: ICD-10-CM

## 2025-07-28 PROBLEM — H04.123 DRY EYE SYNDROME OF BOTH EYES: Status: ACTIVE | Noted: 2025-04-21

## 2025-07-28 PROBLEM — D50.8 IRON DEFICIENCY ANEMIA DUE TO DIETARY CAUSES: Status: ACTIVE | Noted: 2025-04-21

## 2025-07-28 PROBLEM — F81.0 DEVELOPMENTAL READING DISABILITY: Status: ACTIVE | Noted: 2018-10-10

## 2025-07-28 PROBLEM — H52.4 MYOPIA OF BOTH EYES WITH ASTIGMATISM AND PRESBYOPIA: Status: ACTIVE | Noted: 2025-04-21

## 2025-07-28 PROBLEM — H52.13 MYOPIA OF BOTH EYES WITH ASTIGMATISM AND PRESBYOPIA: Status: ACTIVE | Noted: 2025-04-21

## 2025-07-28 PROBLEM — B35.1 ONYCHOMYCOSIS DUE TO DERMATOPHYTE: Status: ACTIVE | Noted: 2018-10-10

## 2025-07-28 PROBLEM — H52.203 MYOPIA OF BOTH EYES WITH ASTIGMATISM AND PRESBYOPIA: Status: ACTIVE | Noted: 2025-04-21

## 2025-07-28 PROBLEM — Z53.20 COLONOSCOPY REFUSED: Status: ACTIVE | Noted: 2018-10-10

## 2025-07-28 PROBLEM — H25.13 NUCLEAR SCLEROTIC CATARACT OF BOTH EYES: Status: ACTIVE | Noted: 2025-04-21

## 2025-07-28 PROBLEM — F10.11 HISTORY OF ALCOHOL ABUSE: Status: ACTIVE | Noted: 2018-10-10

## 2025-07-28 PROBLEM — K21.9 GERD WITHOUT ESOPHAGITIS: Status: ACTIVE | Noted: 2025-04-21

## 2025-07-28 PROBLEM — F41.1 GENERALIZED ANXIETY DISORDER: Status: ACTIVE | Noted: 2025-04-21

## 2025-07-28 LAB
ALBUMIN SERPL BCG-MCNC: 4.2 G/DL (ref 3.5–5.2)
ALBUMIN UR-MCNC: 20 MG/DL
ALP SERPL-CCNC: 72 U/L (ref 40–150)
ALT SERPL W P-5'-P-CCNC: 21 U/L (ref 0–70)
ANION GAP SERPL CALCULATED.3IONS-SCNC: 15 MMOL/L (ref 7–15)
APPEARANCE UR: CLEAR
AST SERPL W P-5'-P-CCNC: 25 U/L (ref 0–45)
BASOPHILS # BLD AUTO: 0.1 10E3/UL (ref 0–0.2)
BASOPHILS NFR BLD AUTO: 1 %
BILIRUB SERPL-MCNC: 0.6 MG/DL
BILIRUB UR QL STRIP: NEGATIVE
BUN SERPL-MCNC: 42.3 MG/DL (ref 8–23)
CALCIUM SERPL-MCNC: 9.8 MG/DL (ref 8.8–10.4)
CHLORIDE SERPL-SCNC: 103 MMOL/L (ref 98–107)
CHOLEST SERPL-MCNC: 129 MG/DL
COLOR UR AUTO: YELLOW
CREAT SERPL-MCNC: 1.55 MG/DL (ref 0.67–1.17)
CREAT UR-MCNC: 406.2 MG/DL
EGFRCR SERPLBLD CKD-EPI 2021: 51 ML/MIN/1.73M2
EOSINOPHIL # BLD AUTO: 0.1 10E3/UL (ref 0–0.7)
EOSINOPHIL NFR BLD AUTO: 1 %
ERYTHROCYTE [DISTWIDTH] IN BLOOD BY AUTOMATED COUNT: 13 % (ref 10–15)
EST. AVERAGE GLUCOSE BLD GHB EST-MCNC: 126 MG/DL
FASTING STATUS PATIENT QL REPORTED: ABNORMAL
FASTING STATUS PATIENT QL REPORTED: NORMAL
GLUCOSE SERPL-MCNC: 70 MG/DL (ref 70–99)
GLUCOSE UR STRIP-MCNC: 30 MG/DL
HBA1C MFR BLD: 6 %
HCO3 SERPL-SCNC: 18 MMOL/L (ref 22–29)
HCT VFR BLD AUTO: 41.9 % (ref 40–53)
HDLC SERPL-MCNC: 54 MG/DL
HGB BLD-MCNC: 14.6 G/DL (ref 13.3–17.7)
HGB UR QL STRIP: NEGATIVE
HYALINE CASTS: 10 /LPF
IMM GRANULOCYTES # BLD: 0 10E3/UL
IMM GRANULOCYTES NFR BLD: 0 %
KETONES UR STRIP-MCNC: ABNORMAL MG/DL
LDLC SERPL CALC-MCNC: 64 MG/DL
LEUKOCYTE ESTERASE UR QL STRIP: NEGATIVE
LYMPHOCYTES # BLD AUTO: 3 10E3/UL (ref 0.8–5.3)
LYMPHOCYTES NFR BLD AUTO: 25 %
MCH RBC QN AUTO: 32.2 PG (ref 26.5–33)
MCHC RBC AUTO-ENTMCNC: 34.8 G/DL (ref 31.5–36.5)
MCV RBC AUTO: 92 FL (ref 78–100)
MICROALBUMIN UR-MCNC: 16.4 MG/L
MICROALBUMIN/CREAT UR: 4.04 MG/G CR (ref 0–17)
MONOCYTES # BLD AUTO: 0.8 10E3/UL (ref 0–1.3)
MONOCYTES NFR BLD AUTO: 7 %
MUCOUS THREADS #/AREA URNS LPF: PRESENT /LPF
NEUTROPHILS # BLD AUTO: 7.8 10E3/UL (ref 1.6–8.3)
NEUTROPHILS NFR BLD AUTO: 66 %
NITRATE UR QL: NEGATIVE
NONHDLC SERPL-MCNC: 75 MG/DL
NRBC # BLD AUTO: 0 10E3/UL
NRBC BLD AUTO-RTO: 0 /100
PH UR STRIP: 5.5 [PH] (ref 4.7–8)
PLATELET # BLD AUTO: 202 10E3/UL (ref 150–450)
POTASSIUM SERPL-SCNC: 4.1 MMOL/L (ref 3.4–5.3)
PROT SERPL-MCNC: 7.6 G/DL (ref 6.4–8.3)
RBC # BLD AUTO: 4.54 10E6/UL (ref 4.4–5.9)
RBC URINE: 0 /HPF
SODIUM SERPL-SCNC: 136 MMOL/L (ref 135–145)
SP GR UR STRIP: 1.03 (ref 1–1.03)
SQUAMOUS EPITHELIAL: 0 /HPF
TRIGL SERPL-MCNC: 54 MG/DL
UROBILINOGEN UR STRIP-MCNC: 3 MG/DL
WBC # BLD AUTO: 11.8 10E3/UL (ref 4–11)
WBC URINE: <1 /HPF

## 2025-07-28 PROCEDURE — 85014 HEMATOCRIT: CPT | Mod: ZL | Performed by: NURSE PRACTITIONER

## 2025-07-28 PROCEDURE — 82465 ASSAY BLD/SERUM CHOLESTEROL: CPT | Mod: ZL | Performed by: NURSE PRACTITIONER

## 2025-07-28 PROCEDURE — 82040 ASSAY OF SERUM ALBUMIN: CPT | Mod: ZL | Performed by: NURSE PRACTITIONER

## 2025-07-28 PROCEDURE — 81001 URINALYSIS AUTO W/SCOPE: CPT | Mod: ZL | Performed by: NURSE PRACTITIONER

## 2025-07-28 PROCEDURE — 83036 HEMOGLOBIN GLYCOSYLATED A1C: CPT | Mod: ZL | Performed by: NURSE PRACTITIONER

## 2025-07-28 PROCEDURE — G0463 HOSPITAL OUTPT CLINIC VISIT: HCPCS

## 2025-07-28 PROCEDURE — 36415 COLL VENOUS BLD VENIPUNCTURE: CPT | Mod: ZL | Performed by: NURSE PRACTITIONER

## 2025-07-28 PROCEDURE — 82043 UR ALBUMIN QUANTITATIVE: CPT | Mod: ZL | Performed by: NURSE PRACTITIONER

## 2025-07-28 RX ORDER — ASPIRIN 81 MG/1
81 TABLET, COATED ORAL DAILY
COMMUNITY

## 2025-07-28 ASSESSMENT — PAIN SCALES - GENERAL: PAINLEVEL_OUTOF10: SEVERE PAIN (8)

## 2025-07-28 NOTE — PROGRESS NOTES
Assessment & Plan     (M54.41) Acute right-sided low back pain with right-sided sciatica  (primary encounter diagnosis)  Comment: Improving  Plan: Reviewed patient's multiple [facility] reports dated 7/10/2025, 7/14/2025, 7/16/2025, 7/17/2025, and 7/20/2025.  Patient was previously taking prescribed methocarbamol 500 mg 3 times daily as needed, however, he was taking methocarbamol 500 mg, 2 tablets, 3 times daily, therefore the prescription was not refilled.  He will continue with his home exercises.  If his symptoms persist, he will be referred to physical therapy.  Will continue to monitor.    (M53.3) Sacroiliac joint dysfunction of right side  Comment: Improving  Plan: Patient will continue with his home exercises.  If his symptoms persist, he will be referred to physical therapy.  Will continue to monitor.    (I95.89) Other specified hypotension  Comment: Worsened  Plan: Blood pressure is low today.  Previous blood pressure was low at 82/55 on 7/28/2025 in the [facility].  He has not been drinking alcohol and he has been drinking less water recently.  Patient was instructed to increase his fluid intake.  Will hold lisinopril for 1 week.  With the patient's permission, spoke with his sister-in-law, Emiliana, on the phone today, in patient's presence.  Updated and on patient's lab results and very low blood pressure as well as plan to hold  lisinopril.  She will obtain a blood pressure machine and make sure that his blood pressures monitor daily.  Most likely, will need to resume lisinopril at a lower dose.  Patient will return to the clinic office tomorrow for reevaluation.  Will continue to monitor.    (E11.65) Type 2 diabetes mellitus with hyperglycemia, without long-term current use of insulin (H)  Comment: Controlled, improved  Plan: Fasting lab work obtained today.  Results reviewed with patient in person today.  A1c has improved and is slightly elevated at 6.0.  A1c was previously 6.9 on 4/22/2025.  Creatinine is  "elevated at 1.55.  GFR is low at 51.  BUN is elevated at 42.3.  Carbon dioxide is slightly low at 18.  Remaining comprehensive metabolic panel is normal.  Lipid profile is normal.  UA is negative for infection.  Microalbumin is normal.  White count is slightly elevated at 11.8.  This is most likely due to his recent steroid injection.  Remaining CBC is normal.    (I10) Essential hypertension  Comment: Overcorrected  Plan: Blood pressure is low today.  Previous blood pressure was low at 82/55 on 7/28/2025 in the [facility].  He has not been drinking alcohol and he has been drinking less water recently.  Patient was instructed to increase his fluid intake.  Will hold lisinopril for 1 week.  Spoke with his sister-in-law, and, on the phone today.  She will obtain a blood pressure machine and make sure that his blood pressures monitor daily.  Most likely, will need to resume lisinopril at a lower dose.  Patient will return to the clinic office tomorrow for reevaluation.  Will continue to monitor.    (E78.2) Mixed hyperlipidemia  Comment: Controlled  Plan: Lipid profile is normal.  Will continue with simvastatin 20 mg daily.  Will continue to monitor.    (N28.9) Renal insufficiency  Comment: Worsened  Plan: Creatinine is elevated at 1.55.  GFR is low at 51.  BUN is elevated at 42.3.  Carbon dioxide is slightly low at 18.  Remaining comprehensive metabolic panel is normal.  Patient was instructed to increase his fluid intake.  He will return to the clinic office tomorrow for reevaluation.  Will continue to monitor.    BMI  Estimated body mass index is 42.09 kg/m  as calculated from the following:    Height as of 3/27/25: 1.803 m (5' 11\").    Weight as of this encounter: 136.9 kg (301 lb 12.8 oz).   Weight management plan: Discussed healthy diet and exercise guidelines    Follow-up   Return in about 1 day (around 7/29/2025).    The longitudinal plan of care for the diagnosis(es)/condition(s) as documented were addressed during " "this visit. Due to the added complexity in care, I will continue to support Bradley in the subsequent management and with ongoing continuity of care.    I spent a total of 53 minutes today personally preparing for the patient s visit by reviewing new laboratory results, reviewing recent provider notes within the chart, obtaining the HPI, speaking with patient's sister-in-law on the phone, examining the patient, counseling and educating the patient, entering clinical information into the patient s medical record and coordinating patient care.      Subjective   Bradley is a 59 year old, presenting for the following health issues:  RECHECK        7/28/2025     8:23 AM   Additional Questions   Roomed by Esperanza Kern   Accompanied by None         7/28/2025     8:23 AM   Patient Reported Additional Medications   Patient reports taking the following new medications None     HPI  \"Bimal\" is a 59-year-old male here for follow-up of right hip pain.  He developed severe right-sided flank pain and was seen in the [facility] for evaluation on 7/10/2025, 7/14/2025, 7/16/2025, 7/17/2025, 7/20/2025, and 7/21/2025.  He underwent CT of the abdomen and pelvis on 7/14/2025 which revealed a possible 3 mm kidney stone on the right.  CT of the abdomen and pelvis was repeated on 7/6/2025 and no mention of a kidney stone at that time.  He reports that he was recently seen by orthopedic surgery and received a steroid injection in his right hip.  He plans to follow-up with orthopedic surgery at a later date.  He denies any particular injury.  He continues to experience some pain in his right lower back that radiates into his right buttock intermittently.  At times, the pain can be slightly severe, however, he feels that his pain has improved since last week.  He reports that he has been doing home exercises to help improve his right hip pain.  He previously took methocarbamol as needed with some relief.  He also previously took prednisone.    He is " also here for follow-up of his chronic medical conditions, including type 2 diabetes mellitus, hypertension, and hyperlipidemia.  He reports that his sister-in-law, Emilaina, is no longer setting up his medications for him.  His sister, Maira, is now setting of his medications for him.  He reports that he has been taking his medications regularly.  He denies any recent signs or symptoms of hypoglycemia or hyperglycemia.  No dizziness.  He reports that he stopped drinking alcohol over the past several months.  He also reports that he has not been drinking much water, other than flavored water.  He is currently taking lisinopril for treatment of hypertension.  He is currently taking simvastatin for treatment of hyperlipidemia.  He denies any recent fever or illness.  No shortness of breath or chest pain.  Overall, he is feeling well, other than his intermittent right hip pain.      Diabetes Follow-up    How often are you checking your blood sugar? Not at all  What concerns do you have today about your diabetes? None   Do you have any of these symptoms? (Select all that apply)  No numbness or tingling in feet.  No redness, sores or blisters on feet.  No complaints of excessive thirst.  No reports of blurry vision.  No significant changes to weight.          Hyperlipidemia Follow-Up    Are you regularly taking any medication or supplement to lower your cholesterol?   Yes-    Are you having muscle aches or other side effects that you think could be caused by your cholesterol lowering medication?  No    Hypertension Follow-up    Do you check your blood pressure regularly outside of the clinic? No   Are you following a low salt diet? No  Are your blood pressures ever more than 140 on the top number (systolic) OR more   than 90 on the bottom number (diastolic), for example 140/90? N/A    BP Readings from Last 2 Encounters:   07/28/25 (!) 82/55   07/20/25 (!) 152/84     Hemoglobin A1C (%)   Date Value   01/20/2025 8.5 (H)    10/17/2023 6.8 (H)         Anxiety   How are you doing with your anxiety since your last visit? No change  Are you having other symptoms that might be associated with anxiety? No  Have you had a significant life event? No   Are you feeling depressed? No  Do you have any concerns with your use of alcohol or other drugs? No    Social History     Tobacco Use    Smoking status: Never     Passive exposure: Never    Smokeless tobacco: Never   Vaping Use    Vaping status: Never Used   Substance Use Topics    Alcohol use: Yes     Comment: beer and whisky -- pt states every couple weeks ocassional    Drug use: No       How many servings of fruits and vegetables do you eat daily?  4 or more  On average, how many sweetened beverages do you drink each day (Examples: soda, juice, sweet tea, etc.  Do NOT count diet or artificially sweetened beverages)?   0  How many days per week do you exercise enough to make your heart beat faster? 3 or less  How many minutes a day do you exercise enough to make your heart beat faster? 9 or less  How many days per week do you miss taking your medication? 0      No Known Allergies      Current Outpatient Medications   Medication Sig Dispense Refill    ASPIRIN PO Take 81 mg by mouth daily      glipiZIDE (GLUCOTROL) 10 MG tablet Take 10 mg by mouth 2 times daily (before meals).      lancets misc. KIT 1 lancet daily. 1 kit prn    levothyroxine (SYNTHROID/LEVOTHROID) 88 MCG tablet Take 88 mcg by mouth every morning (before breakfast).      METFORMIN HCL PO Take 1,000 mg by mouth 2 times daily (with meals)       methocarbamol (ROBAXIN) 500 MG tablet Take 1 tablet (500 mg) by mouth 3 times daily. 30 tablet 0    naltrexone (DEPADE/REVIA) 50 MG tablet       pioglitazone (ACTOS) 45 MG tablet       prazosin (MINIPRESS) 1 MG capsule Take 3 mg by mouth at bedtime.      predniSONE (DELTASONE) 20 MG tablet Take two tablets (= 40mg) each day for 5 (five) days 10 tablet 0    simvastatin (ZOCOR) 20 MG tablet  Take 1 tablet by mouth daily at 2 pm      aspirin (ASPIRIN ADULT LOW DOSE) 81 MG EC tablet Take 81 mg by mouth daily.       No current facility-administered medications for this visit.          Patient Active Problem List   Diagnosis    Mixed hyperlipidemia    Other specified hypothyroidism    Mild intellectual disabilities    Class 3 severe obesity due to excess calories with serious comorbidity and body mass index (BMI) of 40.0 to 44.9 in adult (H)    Essential hypertension    Developmental reading disability    Dry eye syndrome of both eyes    Generalized anxiety disorder    Gastroesophageal reflux disease without esophagitis    History of alcohol abuse    Iron deficiency anemia due to dietary causes    Myopia of both eyes with astigmatism and presbyopia    Nuclear sclerotic cataract of both eyes    Onychomycosis due to dermatophyte    Type 2 diabetes mellitus with hyperglycemia, without long-term current use of insulin (H)    Renal insufficiency    Microscopic hematuria    Colonoscopy refused          Past Medical History:   Diagnosis Date    Basic learning disability, reading 10/10/2018    Class 3 severe obesity due to excess calories with serious comorbidity and body mass index (BMI) of 40.0 to 44.9 in adult (H) 04/13/2010    Overview:   Updated per 10/1/17 IMO import      Colonoscopy refused 10/10/2018    Developmental reading disability 10/10/2018    Dry eye syndrome of both eyes 04/21/2025    Essential (primary) hypertension 10/10/2018    Essential hypertension 05/11/2021    Gastroesophageal reflux disease without esophagitis     Generalized anxiety disorder     GERD without esophagitis 04/21/2025    History of alcohol abuse 10/10/2018    History of complete eye exam 01/25/2025    DIABETIC EYE EXAM WITHOUT DIABETIC RETINOPATHY- [facility] AT [facility] [place]    Hypocalcemia 02/23/2019    Iron deficiency anemia due to dietary causes     Microscopic hematuria     X1    Mild intellectual disabilities  10/10/2018    Mixed hyperlipidemia 10/10/2018    Myopia of both eyes with astigmatism and presbyopia 2025    Nuclear sclerotic cataract of both eyes 2025    Onychomycosis due to dermatophyte 10/10/2018    Other specified hypothyroidism 2019    Renal insufficiency 2025    Renal insufficiency syndrome 2025    Type 2 diabetes mellitus with hyperglycemia, without long-term current use of insulin (H) 2019          Past Surgical History:   Procedure Laterality Date    MOUTH SURGERY  2017    Oral surgery~Two molars removed    PHACOEMULSIFICATION WITH STANDARD INTRAOCULAR LENS IMPLANT Right 2025    Procedure: Phacoemulsification cataract extraction posterior chamber lens right eye;  Surgeon: Fortino Reveles MD;  Location: [facility] OR          Family History   Problem Relation Age of Onset    Medical history unknown Mother     Alcoholism Father          from alcohol poisoning    Cancer Father     No Known Problems Sister     Diabetes Type 2  Brother     Diabetes Type 2  Brother     Diabetes Type 2  Brother     Twin birth Brother         Patient's twin brother    No Known Problems Brother     No Known Problems Brother     No Known Problems Brother     Diabetes Type 2  Maternal Cousin         IDDM    Glaucoma No family hx of     Macular Degeneration No family hx of           Family Status   Relation Name Status    Mo      Fa   at age 77    Sis  Alive    Bro  Alive    Bro  Alive    Bro  Alive    Bro  Alive    Bro  Alive    Bro  Alive    Bro  Alive    MGMo      MGFa      PGMo      PGFa      MCousin  Alive    Neg  (Not Specified)   No partnership data on file          Social History     Socioeconomic History    Marital status: Single     Spouse name: Not on file    Number of children: 0    Years of education: Not on file    Highest education level: Not on file   Occupational History    Not on file   Tobacco Use    Smoking status:  Never     Passive exposure: Never    Smokeless tobacco: Never   Vaping Use    Vaping status: Never Used   Substance and Sexual Activity    Alcohol use: Yes     Comment: beer and whisky -- pt states every couple weeks ocassional    Drug use: No    Sexual activity: Not on file   Other Topics Concern    Not on file   Social History Narrative    Social History:      Lives independently:  Yes     Caregiver/support person:  Yes (His sister-in-law assists him with his medications and finances.)     Household members:  None     Current Housing:  House     Current occupational status:  Employed     Current occupation:       Additional Social History:  Seven brothers and one sister.  He is a twin.         Social Drivers of Health     Financial Resource Strain: Not on file   Food Insecurity: Not on file   Transportation Needs: Not on file   Physical Activity: Not on file   Stress: Not on file   Social Connections: Not on file   Interpersonal Safety: Low Risk  (3/27/2025)    Interpersonal Safety     Do you feel physically and emotionally safe where you currently live?: Yes     Within the past 12 months, have you been hit, slapped, kicked or otherwise physically hurt by someone?: No     Within the past 12 months, have you been humiliated or emotionally abused in other ways by your partner or ex-partner?: No   Housing Stability: Not on file            Objective    BP (!) 82/55 (BP Location: Left arm, Patient Position: Sitting, Cuff Size: Adult Large)   Pulse 76   Temp 97.4  F (36.3  C) (Tympanic)   Wt (!) 136.9 kg (301 lb 12.8 oz)   SpO2 96%   BMI 42.09 kg/m    Body mass index is 42.09 kg/m .  Physical Exam   General: This is an adequately hydrated 59-year-old male who appears in no acute distress.  He is oriented x 3 with some forgetfulness.  Lungs: Are clear bilaterally. Respirations regular and unlabored.  Cardiovascular: Apical pulse regular. S1 and S2 heard without splitting. No murmur. No audible S3 or S4. No  edema.  Musculoskeletal: Some pain is elicited with palpation of her right SI joint.  No pain elicited in his right lateral hip at this time.  He stands somewhat slowly from a seated position and walks with a tandem gait.  Some pain is elicited over his right SI joint when standing and walking as well.  No pain elicited in his left hip or his left lower back.  Neuro: No evidence of foot drop.  Psych: Alert and oriented in all spheres with some forgetfulness. Short and long-term memory seem to be mostly intact. Mood and affect are appropriate. Speech content is appropriate.        Recent Results (from the past 24 hours)   Hemoglobin A1c   Result Value Ref Range    Estimated Average Glucose 126 (H) <117 mg/dL    Hemoglobin A1C 6.0 (H) <5.7 %   CBC with Platelets & Differential    Narrative    The following orders were created for panel order CBC with Platelets & Differential.  Procedure                               Abnormality         Status                     ---------                               -----------         ------                     CBC with platelets and ...[[id number]]  Abnormal            Final result                 Please view results for these tests on the individual orders.   Comprehensive metabolic panel   Result Value Ref Range    Sodium 136 135 - 145 mmol/L    Potassium 4.1 3.4 - 5.3 mmol/L    Carbon Dioxide (CO2) 18 (L) 22 - 29 mmol/L    Anion Gap 15 7 - 15 mmol/L    Urea Nitrogen 42.3 (H) 8.0 - 23.0 mg/dL    Creatinine 1.55 (H) 0.67 - 1.17 mg/dL    GFR Estimate 51 (L) >60 mL/min/1.73m2    Calcium 9.8 8.8 - 10.4 mg/dL    Chloride 103 98 - 107 mmol/L    Glucose 70 70 - 99 mg/dL    Alkaline Phosphatase 72 40 - 150 U/L    AST 25 0 - 45 U/L    ALT 21 0 - 70 U/L    Protein Total 7.6 6.4 - 8.3 g/dL    Albumin 4.2 3.5 - 5.2 g/dL    Bilirubin Total 0.6 <=1.2 mg/dL    Patient Fasting > 8hrs? Unknown    Lipid Profile   Result Value Ref Range    Cholesterol 129 <200 mg/dL    Triglycerides 54 <150 mg/dL     Direct Measure HDL 54 >=40 mg/dL    LDL Cholesterol Calculated 64 <100 mg/dL    Non HDL Cholesterol 75 <130 mg/dL    Patient Fasting > 8hrs? Unknown     Narrative    Cholesterol  Desirable: < 200 mg/dL  Borderline High: 200 - 239 mg/dL  High: >= 240 mg/dL    Triglycerides  Normal: < 150 mg/dL  Borderline High: 150 - 199 mg/dL  High: 200-499 mg/dL  Very High: >= 500 mg/dL    Direct Measure HDL  Female: >= 50 mg/dL   Male: >= 40 mg/dL    LDL Cholesterol  Desirable: < 100 mg/dL  Above Desirable: 100 - 129 mg/dL   Borderline High: 130 - 159 mg/dL   High:  160 - 189 mg/dL   Very High: >= 190 mg/dL    Non HDL Cholesterol  Desirable: < 130 mg/dL  Above Desirable: 130 - 159 mg/dL  Borderline High: 160 - 189 mg/dL  High: 190 - 219 mg/dL  Very High: >= 220 mg/dL   UA Macroscopic with reflex to Microscopic and Culture    Specimen: Urine, Clean Catch   Result Value Ref Range    Color Urine Yellow Colorless, Straw, Light Yellow, Yellow    Appearance Urine Clear Clear    Glucose Urine 30 (A) Negative mg/dL    Bilirubin Urine Negative Negative    Ketones Urine Trace (A) Negative mg/dL    Specific Gravity Urine 1.034 1.003 - 1.035    Blood Urine Negative Negative    pH Urine 5.5 4.7 - 8.0    Protein Albumin Urine 20 (A) Negative mg/dL    Urobilinogen Urine 3.0 (A) Normal mg/dL    Nitrite Urine Negative Negative    Leukocyte Esterase Urine Negative Negative    Mucus Urine Present (A) None Seen /LPF    RBC Urine 0 <=2 /HPF    WBC Urine <1 <=5 /HPF    Squamous Epithelials Urine 0 <=1 /HPF    Hyaline Casts Urine 10 (H) <=2 /LPF    Narrative    Urine Culture not indicated   Albumin Random Urine Quantitative with Creat Ratio   Result Value Ref Range    Creatinine Urine mg/dL 406.2 mg/dL    Albumin Urine mg/L 16.4 mg/L    Albumin Urine mg/g Cr 4.04 0.00 - 17.00 mg/g Cr   CBC with platelets and differential   Result Value Ref Range    WBC Count 11.8 (H) 4.0 - 11.0 10e3/uL    RBC Count 4.54 4.40 - 5.90 10e6/uL    Hemoglobin 14.6 13.3 -  17.7 g/dL    Hematocrit 41.9 40.0 - 53.0 %    MCV 92 78 - 100 fL    MCH 32.2 26.5 - 33.0 pg    MCHC 34.8 31.5 - 36.5 g/dL    RDW 13.0 10.0 - 15.0 %    Platelet Count 202 150 - 450 10e3/uL    % Neutrophils 66 %    % Lymphocytes 25 %    % Monocytes 7 %    % Eosinophils 1 %    % Basophils 1 %    % Immature Granulocytes 0 %    NRBCs per 100 WBC 0 <1 /100    Absolute Neutrophils 7.8 1.6 - 8.3 10e3/uL    Absolute Lymphocytes 3.0 0.8 - 5.3 10e3/uL    Absolute Monocytes 0.8 0.0 - 1.3 10e3/uL    Absolute Eosinophils 0.1 0.0 - 0.7 10e3/uL    Absolute Basophils 0.1 0.0 - 0.2 10e3/uL    Absolute Immature Granulocytes 0.0 <=0.4 10e3/uL    Absolute NRBCs 0.0 10e3/uL           Signed Electronically by: BERLIN Martinez CNP

## 2025-07-29 ENCOUNTER — OFFICE VISIT (OUTPATIENT)
Dept: FAMILY MEDICINE | Facility: OTHER | Age: 60
End: 2025-07-29
Attending: NURSE PRACTITIONER
Payer: MEDICARE

## 2025-07-29 VITALS
HEART RATE: 73 BPM | TEMPERATURE: 97 F | DIASTOLIC BLOOD PRESSURE: 74 MMHG | SYSTOLIC BLOOD PRESSURE: 128 MMHG | HEIGHT: 71 IN | BODY MASS INDEX: 42.55 KG/M2 | WEIGHT: 303.9 LBS | OXYGEN SATURATION: 96 %

## 2025-07-29 DIAGNOSIS — I10 ESSENTIAL HYPERTENSION: ICD-10-CM

## 2025-07-29 DIAGNOSIS — N28.9 RENAL INSUFFICIENCY: Primary | ICD-10-CM

## 2025-07-29 LAB
ANION GAP SERPL CALCULATED.3IONS-SCNC: 13 MMOL/L (ref 7–15)
BUN SERPL-MCNC: 31.4 MG/DL (ref 8–23)
CALCIUM SERPL-MCNC: 10 MG/DL (ref 8.8–10.4)
CHLORIDE SERPL-SCNC: 104 MMOL/L (ref 98–107)
CREAT SERPL-MCNC: 1.1 MG/DL (ref 0.67–1.17)
EGFRCR SERPLBLD CKD-EPI 2021: 76 ML/MIN/1.73M2
GLUCOSE SERPL-MCNC: 115 MG/DL (ref 70–99)
HCO3 SERPL-SCNC: 20 MMOL/L (ref 22–29)
POTASSIUM SERPL-SCNC: 4.6 MMOL/L (ref 3.4–5.3)
SODIUM SERPL-SCNC: 137 MMOL/L (ref 135–145)

## 2025-07-29 PROCEDURE — G0463 HOSPITAL OUTPT CLINIC VISIT: HCPCS

## 2025-07-29 PROCEDURE — 36415 COLL VENOUS BLD VENIPUNCTURE: CPT | Mod: ZL | Performed by: NURSE PRACTITIONER

## 2025-07-29 PROCEDURE — 80048 BASIC METABOLIC PNL TOTAL CA: CPT | Mod: ZL | Performed by: NURSE PRACTITIONER

## 2025-07-29 ASSESSMENT — PAIN SCALES - GENERAL: PAINLEVEL_OUTOF10: SEVERE PAIN (8)

## 2025-07-29 NOTE — PROGRESS NOTES
"  Assessment & Plan     (N28.9) Renal insufficiency  (primary encounter diagnosis)  Comment: Greatly improved  Plan: Basic metabolic panel  Chem 8 obtained today and results reviewed today.  Renal function has greatly improved.  BUN is slightly elevated at 31.4.  Creatinine and GFR have returned to normal.  Nonfasting glucose is slightly elevated at 115.  Carbon dioxide has improved and is slightly low at 20.  Remaining Chem 8 is normal.  Patient will continue to increase his fluid intake.  Will continue to monitor.    (I10) Essential hypertension  Comment: Controlled  Plan: Blood pressure is normal today.  His sister-in-law will continue to monitor his blood pressure daily and call the clinic office on Friday, 8/1/2025 with an update on the blood pressure readings.  May need to resume lisinopril at a lower dose.  Will continue to monitor.       Follow-up   Return if symptoms worsen or fail to improve.    The longitudinal plan of care for the diagnosis(es)/condition(s) as documented were addressed during this visit. Due to the added complexity in care, I will continue to support Bradley in the subsequent management and with ongoing continuity of care.      Katy Huerta is a 59 year old, presenting for the following health issues:  Hypertension      7/29/2025    11:21 AM   Additional Questions   Roomed by Minnie MIJARES   Accompanied by self       HPI    \"Bimal\" is a 59-year-old male here for follow-up of hypotension.  He was seen in the clinic office yesterday and his blood pressure was quite low.  His renal function was also impaired at that time.  It was determined that he was not drinking as much fluid recently as he was previously.  He was instructed to increase his fluid intake and to hold his lisinopril.  His sister-in-law was instructed to monitor his blood pressure at home.  He reports that his blood pressure was normal when checked last night.  He has increased his fluid intake.  He denies any shortness of " breath or chest pain.  No dizziness or headache.  Overall, he feels that he is currently doing well.    Concern - low BP follow up   Onset: yesterday   Description: was having low BP yesterday, has been drinking more water, no dizziness or lightheadedness. Not checking BP at home himself.  His sister-in-law did check his blood pressure once.   Therapies tried and outcome: None      No Known Allergies      Current Outpatient Medications   Medication Sig Dispense Refill    aspirin (ASPIRIN ADULT LOW DOSE) 81 MG EC tablet Take 81 mg by mouth daily.      ASPIRIN PO Take 81 mg by mouth daily      glipiZIDE (GLUCOTROL) 10 MG tablet Take 10 mg by mouth 2 times daily (before meals).      lancets misc. KIT 1 lancet daily. 1 kit prn    levothyroxine (SYNTHROID/LEVOTHROID) 88 MCG tablet Take 88 mcg by mouth every morning (before breakfast).      METFORMIN HCL PO Take 1,000 mg by mouth 2 times daily (with meals)       methocarbamol (ROBAXIN) 500 MG tablet Take 1 tablet (500 mg) by mouth 3 times daily. 30 tablet 0    naltrexone (DEPADE/REVIA) 50 MG tablet       pioglitazone (ACTOS) 45 MG tablet       prazosin (MINIPRESS) 1 MG capsule Take 3 mg by mouth at bedtime.      simvastatin (ZOCOR) 20 MG tablet Take 1 tablet by mouth daily at 2 pm       No current facility-administered medications for this visit.          Patient Active Problem List   Diagnosis    Mixed hyperlipidemia    Other specified hypothyroidism    Mild intellectual disabilities    Class 3 severe obesity due to excess calories with serious comorbidity and body mass index (BMI) of 40.0 to 44.9 in adult (H)    Essential hypertension    Developmental reading disability    Dry eye syndrome of both eyes    Generalized anxiety disorder    Gastroesophageal reflux disease without esophagitis    History of alcohol abuse    Iron deficiency anemia due to dietary causes    Myopia of both eyes with astigmatism and presbyopia    Nuclear sclerotic cataract of both eyes     Onychomycosis due to dermatophyte    Type 2 diabetes mellitus with hyperglycemia, without long-term current use of insulin (H)    Renal insufficiency    Microscopic hematuria    Colonoscopy refused          Past Medical History:   Diagnosis Date    Basic learning disability, reading 10/10/2018    Class 3 severe obesity due to excess calories with serious comorbidity and body mass index (BMI) of 40.0 to 44.9 in adult (H) 04/13/2010    Overview:   Updated per 10/1/17 IMO import      Colonoscopy refused 10/10/2018    Developmental reading disability 10/10/2018    Dry eye syndrome of both eyes 04/21/2025    Essential (primary) hypertension 10/10/2018    Essential hypertension 05/11/2021    Gastroesophageal reflux disease without esophagitis     Generalized anxiety disorder     GERD without esophagitis 04/21/2025    History of alcohol abuse 10/10/2018    History of complete eye exam 01/25/2025    DIABETIC EYE EXAM WITHOUT DIABETIC RETINOPATHY- [facility] AT [facility] [place]    Hypocalcemia 02/23/2019    Iron deficiency anemia due to dietary causes     Microscopic hematuria     X1    Mild intellectual disabilities 10/10/2018    Mixed hyperlipidemia 10/10/2018    Myopia of both eyes with astigmatism and presbyopia 04/21/2025    Nuclear sclerotic cataract of both eyes 04/21/2025    Onychomycosis due to dermatophyte 10/10/2018    Other specified hypothyroidism 02/23/2019    Renal insufficiency 01/25/2025    Renal insufficiency syndrome 04/21/2025    Type 2 diabetes mellitus with hyperglycemia, without long-term current use of insulin (H) 02/23/2019          Past Surgical History:   Procedure Laterality Date    MOUTH SURGERY  07/01/2017    Oral surgery~Two molars removed    PHACOEMULSIFICATION WITH STANDARD INTRAOCULAR LENS IMPLANT Right 03/27/2025    Procedure: Phacoemulsification cataract extraction posterior chamber lens right eye;  Surgeon: Fortino Reveles MD;  Location: [facility] OR          Family History   Problem  Relation Age of Onset    Medical history unknown Mother     Alcoholism Father          from alcohol poisoning    Cancer Father     No Known Problems Sister     Diabetes Type 2  Brother     Diabetes Type 2  Brother     Diabetes Type 2  Brother     Twin birth Brother         Patient's twin brother    No Known Problems Brother     No Known Problems Brother     No Known Problems Brother     Diabetes Type 2  Maternal Cousin         IDDM    Glaucoma No family hx of     Macular Degeneration No family hx of           Family Status   Relation Name Status    Mo      Fa   at age 77    Sis  Alive    Bro  Alive    Bro  Alive    Bro  Alive    Bro  Alive    Bro  Alive    Bro  Alive    Bro  Alive    MGMo      MGFa      PGMo      PGFa      MCousin  Alive    Neg  (Not Specified)   No partnership data on file          Social History     Socioeconomic History    Marital status: Single     Spouse name: Not on file    Number of children: 0    Years of education: Not on file    Highest education level: Not on file   Occupational History    Not on file   Tobacco Use    Smoking status: Never     Passive exposure: Never    Smokeless tobacco: Never   Vaping Use    Vaping status: Never Used   Substance and Sexual Activity    Alcohol use: Yes     Comment: beer and whisky -- pt states every couple weeks ocassional    Drug use: No    Sexual activity: Not on file   Other Topics Concern    Not on file   Social History Narrative    Social History:      Lives independently:  Yes     Caregiver/support person:  Yes (His sister-in-law assists him with his medications and finances.)     Household members:  None     Current Housing:  House     Current occupational status:  Employed     Current occupation:       Additional Social History:  Seven brothers and one sister.  He is a twin.         Social Drivers of Health     Financial Resource Strain: Not on file   Food Insecurity: Not on file  "  Transportation Needs: Not on file   Physical Activity: Not on file   Stress: Not on file   Social Connections: Not on file   Interpersonal Safety: Low Risk  (3/27/2025)    Interpersonal Safety     Do you feel physically and emotionally safe where you currently live?: Yes     Within the past 12 months, have you been hit, slapped, kicked or otherwise physically hurt by someone?: No     Within the past 12 months, have you been humiliated or emotionally abused in other ways by your partner or ex-partner?: No   Housing Stability: Not on file            Objective    /74 (BP Location: Right arm, Patient Position: Sitting, Cuff Size: Adult Large)   Pulse 73   Temp 97  F (36.1  C) (Tympanic)   Ht 1.803 m (5' 11\")   Wt (!) 137.8 kg (303 lb 14.4 oz)   SpO2 96%   BMI 42.39 kg/m    There is no height or weight on file to calculate BMI.  Physical Exam   General: This is an adequately hydrated 59-year-old male who appears in no acute distress.  He is oriented x 3 with forgetfulness.  Lungs: Are clear bilaterally anteriorly. Respirations regular and unlabored.  Cardiovascular: Apical pulse regular. S1 and S2 heard without splitting. No murmur. No audible S3 or S4. No edema.  Psych: Alert and oriented in all spheres with some forgetfulness. Short and long-term memory seem to be mostly intact. Mood and affect are appropriate. Speech content is appropriate.      Recent Results (from the past 24 hours)   Basic metabolic panel   Result Value Ref Range    Sodium 137 135 - 145 mmol/L    Potassium 4.6 3.4 - 5.3 mmol/L    Chloride 104 98 - 107 mmol/L    Carbon Dioxide (CO2) 20 (L) 22 - 29 mmol/L    Anion Gap 13 7 - 15 mmol/L    Urea Nitrogen 31.4 (H) 8.0 - 23.0 mg/dL    Creatinine 1.10 0.67 - 1.17 mg/dL    GFR Estimate 76 >60 mL/min/1.73m2    Calcium 10.0 8.8 - 10.4 mg/dL    Glucose 115 (H) 70 - 99 mg/dL           Signed Electronically by: BERLIN Martinez CNP    "

## 2025-08-11 ENCOUNTER — TELEPHONE (OUTPATIENT)
Dept: CARE COORDINATION | Facility: OTHER | Age: 60
End: 2025-08-11

## 2025-09-04 DIAGNOSIS — F51.5 NIGHTMARES: Primary | ICD-10-CM

## 2025-09-04 RX ORDER — PRAZOSIN HYDROCHLORIDE 1 MG/1
CAPSULE ORAL
Qty: 90 CAPSULE | Refills: 3 | Status: SHIPPED | OUTPATIENT
Start: 2025-09-04

## (undated) DEVICE — BIN-CATARACT BIN BN37

## (undated) DEVICE — INSTRUMENT WIPE VISIWIPE 581047

## (undated) DEVICE — DRSG TEGADERM 4X4 3/4" 1626

## (undated) DEVICE — CANNULA IRR 7/8IN 25GA VSTC VSCFL 45D 9MM DISP 585045

## (undated) DEVICE — SLEEVE PHACO 2.4MM ANTERIOR SEGMENT YELLOW

## (undated) DEVICE — EYE NDL ANGLED SU MICS BL3420S

## (undated) DEVICE — CANNULA IRR 7/8IN 30GA VSTC VSCFL 45D 9MM DISP 585046

## (undated) DEVICE — EYE KNIFE SLIT XSTAR VISITEC 2.4MM 45DEG BEVEL UP 373724

## (undated) DEVICE — BIN-LENS IMPLANT CART

## (undated) DEVICE — PACK EYE CUSTOM SEY32EPMBO

## (undated) DEVICE — SET HANDPIECE IRRIG/ASPIRATION 2.2-2.8X5.4" 45DEG TIP 85910S

## (undated) DEVICE — BIN-TECNIS DCB00 LENSES

## (undated) DEVICE — EYE KNIFE STILETTO VISITEC 1.1MM ANG 45DEG SIDEPORT 376620

## (undated) DEVICE — EYE PREP BETADINE 5% SOLUTION 30ML 0065-0411-30

## (undated) DEVICE — PACK PHACO STELLARIS VAC 1 AUX DRP 1 NDL WRNCH BL5110

## (undated) DEVICE — EYE CANN IRR 25GA CYSTOTOME 581610

## (undated) DEVICE — GLOVE PROTEXIS POWDER FREE CLSC 7.5  2D72PL75X

## (undated) DEVICE — INJECTOR SIMPLIFEYE FOR ENVISTA 21987

## (undated) DEVICE — CHANG NUCLEUS HYDRODISSECTOR BEVEL 47 DEGREE .40 X 18 585295